# Patient Record
Sex: MALE | Race: WHITE | NOT HISPANIC OR LATINO
[De-identification: names, ages, dates, MRNs, and addresses within clinical notes are randomized per-mention and may not be internally consistent; named-entity substitution may affect disease eponyms.]

---

## 2017-01-26 ENCOUNTER — MEDICATION RENEWAL (OUTPATIENT)
Age: 72
End: 2017-01-26

## 2017-02-15 ENCOUNTER — APPOINTMENT (OUTPATIENT)
Dept: CARDIOLOGY | Facility: CLINIC | Age: 72
End: 2017-02-15

## 2017-02-15 ENCOUNTER — NON-APPOINTMENT (OUTPATIENT)
Age: 72
End: 2017-02-15

## 2017-02-15 VITALS
HEART RATE: 80 BPM | HEIGHT: 69 IN | RESPIRATION RATE: 12 BRPM | OXYGEN SATURATION: 98 % | DIASTOLIC BLOOD PRESSURE: 87 MMHG | WEIGHT: 194 LBS | BODY MASS INDEX: 28.73 KG/M2 | SYSTOLIC BLOOD PRESSURE: 155 MMHG

## 2017-03-01 ENCOUNTER — NON-APPOINTMENT (OUTPATIENT)
Age: 72
End: 2017-03-01

## 2017-03-01 ENCOUNTER — APPOINTMENT (OUTPATIENT)
Dept: CARDIOLOGY | Facility: CLINIC | Age: 72
End: 2017-03-01

## 2017-03-01 VITALS
OXYGEN SATURATION: 98 % | RESPIRATION RATE: 12 BRPM | SYSTOLIC BLOOD PRESSURE: 132 MMHG | WEIGHT: 196 LBS | HEIGHT: 69 IN | HEART RATE: 77 BPM | TEMPERATURE: 98.5 F | DIASTOLIC BLOOD PRESSURE: 81 MMHG | BODY MASS INDEX: 29.03 KG/M2

## 2017-03-02 LAB
ALBUMIN SERPL ELPH-MCNC: 4.3 G/DL
ALP BLD-CCNC: 103 U/L
ALT SERPL-CCNC: 23 U/L
ANION GAP SERPL CALC-SCNC: 18 MMOL/L
AST SERPL-CCNC: 20 U/L
BILIRUB SERPL-MCNC: 0.8 MG/DL
BUN SERPL-MCNC: 18 MG/DL
CALCIUM SERPL-MCNC: 9 MG/DL
CHLORIDE SERPL-SCNC: 103 MMOL/L
CHOLEST SERPL-MCNC: 123 MG/DL
CHOLEST/HDLC SERPL: 3.8 RATIO
CO2 SERPL-SCNC: 22 MMOL/L
CREAT SERPL-MCNC: 1.05 MG/DL
GLUCOSE SERPL-MCNC: 131 MG/DL
HDLC SERPL-MCNC: 32 MG/DL
LDLC SERPL CALC-MCNC: 49 MG/DL
POTASSIUM SERPL-SCNC: 4 MMOL/L
PROT SERPL-MCNC: 7.2 G/DL
SODIUM SERPL-SCNC: 143 MMOL/L
TRIGL SERPL-MCNC: 208 MG/DL

## 2017-03-03 LAB
BASOPHILS # BLD AUTO: 0.01 K/UL
BASOPHILS NFR BLD AUTO: 0.1 %
EOSINOPHIL # BLD AUTO: 0.19 K/UL
EOSINOPHIL NFR BLD AUTO: 2.7 %
HCT VFR BLD CALC: 43.2 %
HGB BLD-MCNC: 14.1 G/DL
IMM GRANULOCYTES NFR BLD AUTO: 0.1 %
LYMPHOCYTES # BLD AUTO: 2.14 K/UL
LYMPHOCYTES NFR BLD AUTO: 30.4 %
MAN DIFF?: NORMAL
MCHC RBC-ENTMCNC: 30.9 PG
MCHC RBC-ENTMCNC: 32.6 GM/DL
MCV RBC AUTO: 94.7 FL
MONOCYTES # BLD AUTO: 0.56 K/UL
MONOCYTES NFR BLD AUTO: 8 %
NEUTROPHILS # BLD AUTO: 4.13 K/UL
NEUTROPHILS NFR BLD AUTO: 58.7 %
PLATELET # BLD AUTO: 220 K/UL
RBC # BLD: 4.56 M/UL
RBC # FLD: 14 %
WBC # FLD AUTO: 7.04 K/UL

## 2017-03-05 ENCOUNTER — NON-APPOINTMENT (OUTPATIENT)
Age: 72
End: 2017-03-05

## 2017-04-24 ENCOUNTER — RX RENEWAL (OUTPATIENT)
Age: 72
End: 2017-04-24

## 2017-04-26 ENCOUNTER — MEDICATION RENEWAL (OUTPATIENT)
Age: 72
End: 2017-04-26

## 2017-05-23 ENCOUNTER — MEDICATION RENEWAL (OUTPATIENT)
Age: 72
End: 2017-05-23

## 2017-09-18 ENCOUNTER — RX RENEWAL (OUTPATIENT)
Age: 72
End: 2017-09-18

## 2017-09-20 ENCOUNTER — MEDICATION RENEWAL (OUTPATIENT)
Age: 72
End: 2017-09-20

## 2017-09-21 ENCOUNTER — RX RENEWAL (OUTPATIENT)
Age: 72
End: 2017-09-21

## 2017-10-31 ENCOUNTER — APPOINTMENT (OUTPATIENT)
Dept: CARDIOLOGY | Facility: CLINIC | Age: 72
End: 2017-10-31
Payer: MEDICARE

## 2017-10-31 ENCOUNTER — NON-APPOINTMENT (OUTPATIENT)
Age: 72
End: 2017-10-31

## 2017-10-31 VITALS — HEART RATE: 87 BPM | SYSTOLIC BLOOD PRESSURE: 134 MMHG | DIASTOLIC BLOOD PRESSURE: 86 MMHG

## 2017-10-31 VITALS
DIASTOLIC BLOOD PRESSURE: 84 MMHG | TEMPERATURE: 97.7 F | OXYGEN SATURATION: 95 % | HEIGHT: 69 IN | HEART RATE: 75 BPM | RESPIRATION RATE: 12 BRPM | WEIGHT: 198 LBS | SYSTOLIC BLOOD PRESSURE: 153 MMHG | BODY MASS INDEX: 29.33 KG/M2

## 2017-10-31 PROCEDURE — 99214 OFFICE O/P EST MOD 30 MIN: CPT | Mod: 25

## 2017-10-31 PROCEDURE — 93000 ELECTROCARDIOGRAM COMPLETE: CPT

## 2017-11-02 LAB
25(OH)D3 SERPL-MCNC: 24.9 NG/ML
ALBUMIN SERPL ELPH-MCNC: 4.6 G/DL
ALP BLD-CCNC: 101 U/L
ALT SERPL-CCNC: 23 U/L
ANION GAP SERPL CALC-SCNC: 14 MMOL/L
AST SERPL-CCNC: 24 U/L
BILIRUB SERPL-MCNC: 0.9 MG/DL
BUN SERPL-MCNC: 23 MG/DL
CALCIUM SERPL-MCNC: 9.6 MG/DL
CHLORIDE SERPL-SCNC: 99 MMOL/L
CHOLEST SERPL-MCNC: 130 MG/DL
CHOLEST/HDLC SERPL: 3.3 RATIO
CO2 SERPL-SCNC: 27 MMOL/L
CREAT SERPL-MCNC: 1.16 MG/DL
GLUCOSE SERPL-MCNC: 89 MG/DL
HBA1C MFR BLD HPLC: 5.4 %
HDLC SERPL-MCNC: 39 MG/DL
LDLC SERPL CALC-MCNC: 71 MG/DL
POTASSIUM SERPL-SCNC: 4.3 MMOL/L
PROT SERPL-MCNC: 8.4 G/DL
SODIUM SERPL-SCNC: 140 MMOL/L
TRIGL SERPL-MCNC: 101 MG/DL
TSH SERPL-ACNC: 1.9 UIU/ML

## 2017-11-09 LAB
BASOPHILS # BLD AUTO: 0.02 K/UL
BASOPHILS NFR BLD AUTO: 0.3 %
EOSINOPHIL # BLD AUTO: 0.13 K/UL
EOSINOPHIL NFR BLD AUTO: 1.9 %
HCT VFR BLD CALC: 46.6 %
HGB BLD-MCNC: 15.1 G/DL
IMM GRANULOCYTES NFR BLD AUTO: 0.3 %
LYMPHOCYTES # BLD AUTO: 2.15 K/UL
LYMPHOCYTES NFR BLD AUTO: 31.9 %
MAN DIFF?: NORMAL
MCHC RBC-ENTMCNC: 31.2 PG
MCHC RBC-ENTMCNC: 32.4 GM/DL
MCV RBC AUTO: 96.3 FL
MONOCYTES # BLD AUTO: 0.42 K/UL
MONOCYTES NFR BLD AUTO: 6.2 %
NEUTROPHILS # BLD AUTO: 4 K/UL
NEUTROPHILS NFR BLD AUTO: 59.4 %
PLATELET # BLD AUTO: 206 K/UL
RBC # BLD: 4.84 M/UL
RBC # FLD: 14.2 %
WBC # FLD AUTO: 6.74 K/UL

## 2017-12-18 ENCOUNTER — NON-APPOINTMENT (OUTPATIENT)
Age: 72
End: 2017-12-18

## 2017-12-18 ENCOUNTER — APPOINTMENT (OUTPATIENT)
Dept: CARDIOLOGY | Facility: CLINIC | Age: 72
End: 2017-12-18
Payer: MEDICARE

## 2017-12-18 VITALS
SYSTOLIC BLOOD PRESSURE: 145 MMHG | TEMPERATURE: 98.1 F | RESPIRATION RATE: 12 BRPM | BODY MASS INDEX: 27.7 KG/M2 | HEIGHT: 69 IN | DIASTOLIC BLOOD PRESSURE: 81 MMHG | OXYGEN SATURATION: 98 % | WEIGHT: 187 LBS | HEART RATE: 91 BPM

## 2017-12-18 VITALS — SYSTOLIC BLOOD PRESSURE: 126 MMHG | HEART RATE: 87 BPM | DIASTOLIC BLOOD PRESSURE: 76 MMHG

## 2017-12-18 PROCEDURE — 99214 OFFICE O/P EST MOD 30 MIN: CPT | Mod: 25

## 2017-12-18 PROCEDURE — 93000 ELECTROCARDIOGRAM COMPLETE: CPT

## 2017-12-18 RX ORDER — PROPRANOLOL HYDROCHLORIDE 120 MG/1
120 CAPSULE, EXTENDED RELEASE ORAL
Qty: 90 | Refills: 1 | Status: DISCONTINUED | COMMUNITY
Start: 2017-09-21 | End: 2017-12-18

## 2017-12-20 LAB
ALBUMIN SERPL ELPH-MCNC: 3.5 G/DL
ALP BLD-CCNC: 87 U/L
ALT SERPL-CCNC: 133 U/L
ANION GAP SERPL CALC-SCNC: 14 MMOL/L
AST SERPL-CCNC: 53 U/L
BASOPHILS # BLD AUTO: 0.01 K/UL
BASOPHILS NFR BLD AUTO: 0.1 %
BILIRUB SERPL-MCNC: 0.8 MG/DL
BUN SERPL-MCNC: 34 MG/DL
CALCIUM SERPL-MCNC: 9.4 MG/DL
CHLORIDE SERPL-SCNC: 101 MMOL/L
CHOLEST SERPL-MCNC: 169 MG/DL
CHOLEST/HDLC SERPL: 5.3 RATIO
CO2 SERPL-SCNC: 23 MMOL/L
CREAT SERPL-MCNC: 1.4 MG/DL
EOSINOPHIL # BLD AUTO: 0.03 K/UL
EOSINOPHIL NFR BLD AUTO: 0.3 %
GLUCOSE SERPL-MCNC: 119 MG/DL
HCT VFR BLD CALC: 45 %
HDLC SERPL-MCNC: 32 MG/DL
HGB BLD-MCNC: 14 G/DL
IMM GRANULOCYTES NFR BLD AUTO: 0.9 %
LDLC SERPL CALC-MCNC: 92 MG/DL
LYMPHOCYTES # BLD AUTO: 1.83 K/UL
LYMPHOCYTES NFR BLD AUTO: 15.6 %
MAN DIFF?: NORMAL
MCHC RBC-ENTMCNC: 30.2 PG
MCHC RBC-ENTMCNC: 31.1 GM/DL
MCV RBC AUTO: 97 FL
MONOCYTES # BLD AUTO: 0.68 K/UL
MONOCYTES NFR BLD AUTO: 5.8 %
NEUTROPHILS # BLD AUTO: 9.11 K/UL
NEUTROPHILS NFR BLD AUTO: 77.3 %
PLATELET # BLD AUTO: 369 K/UL
POTASSIUM SERPL-SCNC: 4.4 MMOL/L
PROT SERPL-MCNC: 7.7 G/DL
RBC # BLD: 4.64 M/UL
RBC # FLD: 14.1 %
SODIUM SERPL-SCNC: 138 MMOL/L
TRIGL SERPL-MCNC: 226 MG/DL
TSH SERPL-ACNC: 1.7 UIU/ML
WBC # FLD AUTO: 11.76 K/UL

## 2018-01-07 ENCOUNTER — TRANSCRIPTION ENCOUNTER (OUTPATIENT)
Age: 73
End: 2018-01-07

## 2018-01-11 ENCOUNTER — NON-APPOINTMENT (OUTPATIENT)
Age: 73
End: 2018-01-11

## 2018-01-11 ENCOUNTER — APPOINTMENT (OUTPATIENT)
Dept: CARDIOLOGY | Facility: CLINIC | Age: 73
End: 2018-01-11
Payer: MEDICARE

## 2018-01-11 VITALS
HEART RATE: 78 BPM | HEIGHT: 69 IN | OXYGEN SATURATION: 96 % | BODY MASS INDEX: 27.7 KG/M2 | SYSTOLIC BLOOD PRESSURE: 128 MMHG | DIASTOLIC BLOOD PRESSURE: 76 MMHG | WEIGHT: 187 LBS

## 2018-01-11 PROCEDURE — 99214 OFFICE O/P EST MOD 30 MIN: CPT | Mod: 25

## 2018-01-11 PROCEDURE — 93000 ELECTROCARDIOGRAM COMPLETE: CPT

## 2018-01-15 ENCOUNTER — NON-APPOINTMENT (OUTPATIENT)
Age: 73
End: 2018-01-15

## 2018-01-19 ENCOUNTER — MEDICATION RENEWAL (OUTPATIENT)
Age: 73
End: 2018-01-19

## 2018-02-07 ENCOUNTER — APPOINTMENT (OUTPATIENT)
Dept: CARDIOLOGY | Facility: CLINIC | Age: 73
End: 2018-02-07
Payer: MEDICARE

## 2018-02-07 ENCOUNTER — NON-APPOINTMENT (OUTPATIENT)
Age: 73
End: 2018-02-07

## 2018-02-07 VITALS
SYSTOLIC BLOOD PRESSURE: 133 MMHG | HEART RATE: 75 BPM | TEMPERATURE: 98.1 F | DIASTOLIC BLOOD PRESSURE: 72 MMHG | OXYGEN SATURATION: 98 % | WEIGHT: 190 LBS | RESPIRATION RATE: 12 BRPM | HEIGHT: 69 IN | BODY MASS INDEX: 28.14 KG/M2

## 2018-02-07 PROCEDURE — 99214 OFFICE O/P EST MOD 30 MIN: CPT | Mod: 25

## 2018-02-07 PROCEDURE — 93000 ELECTROCARDIOGRAM COMPLETE: CPT

## 2018-02-10 LAB
ALBUMIN SERPL ELPH-MCNC: 4 G/DL
ALP BLD-CCNC: 95 U/L
ALT SERPL-CCNC: 10 U/L
ANION GAP SERPL CALC-SCNC: 15 MMOL/L
ANION GAP SERPL CALC-SCNC: 15 MMOL/L
AST SERPL-CCNC: 26 U/L
BASOPHILS # BLD AUTO: 0.01 K/UL
BASOPHILS NFR BLD AUTO: 0.1 %
BILIRUB SERPL-MCNC: 1 MG/DL
BUN SERPL-MCNC: 21 MG/DL
BUN SERPL-MCNC: 23 MG/DL
CALCIUM SERPL-MCNC: 9.6 MG/DL
CALCIUM SERPL-MCNC: 9.8 MG/DL
CHLORIDE SERPL-SCNC: 99 MMOL/L
CHLORIDE SERPL-SCNC: 99 MMOL/L
CO2 SERPL-SCNC: 27 MMOL/L
CO2 SERPL-SCNC: 28 MMOL/L
CREAT SERPL-MCNC: 1.41 MG/DL
CREAT SERPL-MCNC: 1.46 MG/DL
EOSINOPHIL # BLD AUTO: 0.2 K/UL
EOSINOPHIL NFR BLD AUTO: 2.7 %
GLUCOSE SERPL-MCNC: 101 MG/DL
GLUCOSE SERPL-MCNC: 105 MG/DL
HCT VFR BLD CALC: 40.1 %
HGB BLD-MCNC: 13.1 G/DL
IMM GRANULOCYTES NFR BLD AUTO: 1 %
LYMPHOCYTES # BLD AUTO: 2.39 K/UL
LYMPHOCYTES NFR BLD AUTO: 32.8 %
MAN DIFF?: NORMAL
MCHC RBC-ENTMCNC: 30.2 PG
MCHC RBC-ENTMCNC: 32.7 GM/DL
MCV RBC AUTO: 92.4 FL
MONOCYTES # BLD AUTO: 0.75 K/UL
MONOCYTES NFR BLD AUTO: 10.3 %
NEUTROPHILS # BLD AUTO: 3.87 K/UL
NEUTROPHILS NFR BLD AUTO: 53.1 %
PLATELET # BLD AUTO: 250 K/UL
POTASSIUM SERPL-SCNC: 4 MMOL/L
POTASSIUM SERPL-SCNC: 4.1 MMOL/L
PROT SERPL-MCNC: 7.6 G/DL
RBC # BLD: 4.34 M/UL
RBC # FLD: 14.1 %
SODIUM SERPL-SCNC: 141 MMOL/L
SODIUM SERPL-SCNC: 142 MMOL/L
WBC # FLD AUTO: 7.29 K/UL

## 2018-02-24 ENCOUNTER — RX RENEWAL (OUTPATIENT)
Age: 73
End: 2018-02-24

## 2018-05-04 ENCOUNTER — RX RENEWAL (OUTPATIENT)
Age: 73
End: 2018-05-04

## 2018-05-10 ENCOUNTER — APPOINTMENT (OUTPATIENT)
Dept: INTERNAL MEDICINE | Facility: CLINIC | Age: 73
End: 2018-05-10
Payer: MEDICARE

## 2018-05-10 VITALS — DIASTOLIC BLOOD PRESSURE: 68 MMHG | SYSTOLIC BLOOD PRESSURE: 130 MMHG

## 2018-05-10 VITALS
DIASTOLIC BLOOD PRESSURE: 78 MMHG | OXYGEN SATURATION: 97 % | HEIGHT: 69 IN | TEMPERATURE: 99.1 F | HEART RATE: 75 BPM | RESPIRATION RATE: 12 BRPM | SYSTOLIC BLOOD PRESSURE: 145 MMHG | BODY MASS INDEX: 29.33 KG/M2 | WEIGHT: 198 LBS

## 2018-05-10 DIAGNOSIS — Z12.5 ENCOUNTER FOR SCREENING FOR MALIGNANT NEOPLASM OF PROSTATE: ICD-10-CM

## 2018-05-10 DIAGNOSIS — Z12.11 ENCOUNTER FOR SCREENING FOR MALIGNANT NEOPLASM OF COLON: ICD-10-CM

## 2018-05-10 DIAGNOSIS — Z78.9 OTHER SPECIFIED HEALTH STATUS: ICD-10-CM

## 2018-05-10 PROCEDURE — G0438: CPT

## 2018-05-16 ENCOUNTER — APPOINTMENT (OUTPATIENT)
Dept: CARDIOLOGY | Facility: CLINIC | Age: 73
End: 2018-05-16
Payer: MEDICARE

## 2018-05-16 ENCOUNTER — NON-APPOINTMENT (OUTPATIENT)
Age: 73
End: 2018-05-16

## 2018-05-16 ENCOUNTER — RX RENEWAL (OUTPATIENT)
Age: 73
End: 2018-05-16

## 2018-05-16 VITALS
WEIGHT: 196 LBS | DIASTOLIC BLOOD PRESSURE: 83 MMHG | RESPIRATION RATE: 12 BRPM | BODY MASS INDEX: 29.03 KG/M2 | HEART RATE: 80 BPM | OXYGEN SATURATION: 96 % | SYSTOLIC BLOOD PRESSURE: 139 MMHG | HEIGHT: 69 IN

## 2018-05-16 DIAGNOSIS — I27.0 PRIMARY PULMONARY HYPERTENSION: ICD-10-CM

## 2018-05-16 PROCEDURE — 99213 OFFICE O/P EST LOW 20 MIN: CPT

## 2018-05-16 PROCEDURE — 93306 TTE W/DOPPLER COMPLETE: CPT

## 2018-05-16 PROCEDURE — 99214 OFFICE O/P EST MOD 30 MIN: CPT | Mod: 25

## 2018-05-16 PROCEDURE — 93000 ELECTROCARDIOGRAM COMPLETE: CPT

## 2018-05-19 ENCOUNTER — NON-APPOINTMENT (OUTPATIENT)
Age: 73
End: 2018-05-19

## 2018-05-23 LAB
25(OH)D3 SERPL-MCNC: 15.8 NG/ML
ALBUMIN SERPL ELPH-MCNC: 4.5 G/DL
ALP BLD-CCNC: 79 U/L
ALT SERPL-CCNC: 6 U/L
ANION GAP SERPL CALC-SCNC: 15 MMOL/L
AST SERPL-CCNC: 19 U/L
BACTERIA UR CULT: NORMAL
BASOPHILS # BLD AUTO: 0.01 K/UL
BASOPHILS NFR BLD AUTO: 0.1 %
BILIRUB SERPL-MCNC: 0.8 MG/DL
BUN SERPL-MCNC: 19 MG/DL
CALCIUM SERPL-MCNC: 9.5 MG/DL
CHLORIDE SERPL-SCNC: 99 MMOL/L
CHOLEST SERPL-MCNC: 222 MG/DL
CHOLEST/HDLC SERPL: 6.3 RATIO
CO2 SERPL-SCNC: 28 MMOL/L
CREAT SERPL-MCNC: 1.22 MG/DL
CREAT SPEC-SCNC: 48 MG/DL
EOSINOPHIL # BLD AUTO: 0.13 K/UL
EOSINOPHIL NFR BLD AUTO: 1.9 %
FOLATE SERPL-MCNC: 11.7 NG/ML
GLUCOSE SERPL-MCNC: 74 MG/DL
HBA1C MFR BLD HPLC: 5.4 %
HCT VFR BLD CALC: 48 %
HDLC SERPL-MCNC: 35 MG/DL
HGB BLD-MCNC: 15.1 G/DL
IMM GRANULOCYTES NFR BLD AUTO: 0.3 %
IRON SATN MFR SERPL: 26 %
IRON SERPL-MCNC: 95 UG/DL
LDLC SERPL CALC-MCNC: 150 MG/DL
LYMPHOCYTES # BLD AUTO: 2.44 K/UL
LYMPHOCYTES NFR BLD AUTO: 36 %
MAN DIFF?: NORMAL
MCHC RBC-ENTMCNC: 30 PG
MCHC RBC-ENTMCNC: 31.5 GM/DL
MCV RBC AUTO: 95.4 FL
MICROALBUMIN 24H UR DL<=1MG/L-MCNC: 0.7 MG/DL
MICROALBUMIN/CREAT 24H UR-RTO: 15 MG/G
MONOCYTES # BLD AUTO: 0.54 K/UL
MONOCYTES NFR BLD AUTO: 8 %
NEUTROPHILS # BLD AUTO: 3.64 K/UL
NEUTROPHILS NFR BLD AUTO: 53.7 %
PLATELET # BLD AUTO: 224 K/UL
POTASSIUM SERPL-SCNC: 4 MMOL/L
PROT SERPL-MCNC: 8.1 G/DL
PSA SERPL-MCNC: 0.92 NG/ML
RBC # BLD: 5.03 M/UL
RBC # FLD: 14.7 %
SODIUM SERPL-SCNC: 142 MMOL/L
TIBC SERPL-MCNC: 368 UG/DL
TRIGL SERPL-MCNC: 184 MG/DL
TSH SERPL-ACNC: 2.16 UIU/ML
UIBC SERPL-MCNC: 273 UG/DL
VIT B12 SERPL-MCNC: 370 PG/ML
WBC # FLD AUTO: 6.78 K/UL

## 2018-06-21 DIAGNOSIS — R79.89 OTHER SPECIFIED ABNORMAL FINDINGS OF BLOOD CHEMISTRY: ICD-10-CM

## 2018-06-21 LAB
APPEARANCE: CLEAR
BILIRUBIN URINE: NEGATIVE
BLOOD URINE: NEGATIVE
COLOR: YELLOW
GLUCOSE QUALITATIVE U: NEGATIVE MG/DL
KETONES URINE: NEGATIVE
LEUKOCYTE ESTERASE URINE: NEGATIVE
NITRITE URINE: NEGATIVE
PH URINE: 5.5
PROTEIN URINE: NEGATIVE MG/DL
SPECIFIC GRAVITY URINE: 1.01
UROBILINOGEN URINE: NEGATIVE MG/DL

## 2018-06-22 RX ORDER — CHOLECALCIFEROL (VITAMIN D3) 1250 MCG
1.25 MG CAPSULE ORAL
Qty: 14 | Refills: 0 | Status: ACTIVE | COMMUNITY
Start: 2018-06-21 | End: 1900-01-01

## 2018-09-11 ENCOUNTER — RX RENEWAL (OUTPATIENT)
Age: 73
End: 2018-09-11

## 2018-10-28 ENCOUNTER — RX RENEWAL (OUTPATIENT)
Age: 73
End: 2018-10-28

## 2018-11-19 ENCOUNTER — RX RENEWAL (OUTPATIENT)
Age: 73
End: 2018-11-19

## 2018-11-21 ENCOUNTER — APPOINTMENT (OUTPATIENT)
Dept: CARDIOLOGY | Facility: CLINIC | Age: 73
End: 2018-11-21
Payer: MEDICARE

## 2018-11-21 ENCOUNTER — NON-APPOINTMENT (OUTPATIENT)
Age: 73
End: 2018-11-21

## 2018-11-21 VITALS
HEART RATE: 75 BPM | HEIGHT: 69 IN | OXYGEN SATURATION: 99 % | BODY MASS INDEX: 29.62 KG/M2 | WEIGHT: 200 LBS | SYSTOLIC BLOOD PRESSURE: 154 MMHG | DIASTOLIC BLOOD PRESSURE: 72 MMHG | RESPIRATION RATE: 12 BRPM

## 2018-11-21 PROCEDURE — 99214 OFFICE O/P EST MOD 30 MIN: CPT | Mod: 25

## 2018-11-21 PROCEDURE — 93000 ELECTROCARDIOGRAM COMPLETE: CPT

## 2018-11-21 PROCEDURE — 99212 OFFICE O/P EST SF 10 MIN: CPT

## 2018-11-21 RX ORDER — PREDNISONE 20 MG/1
20 TABLET ORAL
Qty: 4 | Refills: 0 | Status: DISCONTINUED | COMMUNITY
End: 2018-11-21

## 2018-11-21 NOTE — HISTORY OF PRESENT ILLNESS
[FreeTextEntry1] : Mr Wakefield arrives today for follow up of arrhythmia management.  He is feeling very well. He is maintaining sinus rhythm on Multaq. Excellent exercise tolerance, however still mild left left strength, stroke residual. Tolerating anticoagulation with no issues.

## 2018-11-21 NOTE — PHYSICAL EXAM
[General Appearance - Well Developed] : well developed [General Appearance - In No Acute Distress] : no acute distress [Normal Oral Mucosa] : normal oral mucosa [Heart Rate And Rhythm] : heart rate and rhythm were normal [Heart Sounds] : normal S1 and S2 [Respiration, Rhythm And Depth] : normal respiratory rhythm and effort [Bowel Sounds] : normal bowel sounds [Abnormal Walk] : normal gait [Skin Color & Pigmentation] : normal skin color and pigmentation [Oriented To Time, Place, And Person] : oriented to person, place, and time

## 2018-11-21 NOTE — DISCUSSION/SUMMARY
[FreeTextEntry1] : Mr Wakefield is a 69 y/o male with recent CABG x 2, ASD closure, MAZE, Post operative stroke with left hemiparesis, now almost resolved (left leg still feels tight). He presents in sinus rhythm.  He has been off  on Multaq and anticoagulation. No changes from EP stand point. See back in a year.

## 2018-11-22 ENCOUNTER — NON-APPOINTMENT (OUTPATIENT)
Age: 73
End: 2018-11-22

## 2018-11-23 LAB
25(OH)D3 SERPL-MCNC: 48.1 NG/ML
ALBUMIN SERPL ELPH-MCNC: 4.2 G/DL
ALP BLD-CCNC: 84 U/L
ALT SERPL-CCNC: 16 U/L
ANION GAP SERPL CALC-SCNC: 13 MMOL/L
AST SERPL-CCNC: 17 U/L
BASOPHILS # BLD AUTO: 0.01 K/UL
BASOPHILS NFR BLD AUTO: 0.1 %
BILIRUB SERPL-MCNC: 0.8 MG/DL
BUN SERPL-MCNC: 26 MG/DL
CALCIUM SERPL-MCNC: 9.4 MG/DL
CHLORIDE SERPL-SCNC: 103 MMOL/L
CHOLEST SERPL-MCNC: 124 MG/DL
CHOLEST/HDLC SERPL: 3.8 RATIO
CO2 SERPL-SCNC: 25 MMOL/L
CREAT SERPL-MCNC: 1.26 MG/DL
EOSINOPHIL # BLD AUTO: 0.19 K/UL
EOSINOPHIL NFR BLD AUTO: 2.7 %
GLUCOSE SERPL-MCNC: 87 MG/DL
HBA1C MFR BLD HPLC: 5.4 %
HCT VFR BLD CALC: 43.8 %
HDLC SERPL-MCNC: 33 MG/DL
HGB BLD-MCNC: 14.5 G/DL
IMM GRANULOCYTES NFR BLD AUTO: 0.1 %
LDLC SERPL CALC-MCNC: 66 MG/DL
LYMPHOCYTES # BLD AUTO: 2.42 K/UL
LYMPHOCYTES NFR BLD AUTO: 34.2 %
MAN DIFF?: NORMAL
MCHC RBC-ENTMCNC: 31.4 PG
MCHC RBC-ENTMCNC: 33.1 GM/DL
MCV RBC AUTO: 94.8 FL
MONOCYTES # BLD AUTO: 0.52 K/UL
MONOCYTES NFR BLD AUTO: 7.4 %
NEUTROPHILS # BLD AUTO: 3.92 K/UL
NEUTROPHILS NFR BLD AUTO: 55.5 %
PLATELET # BLD AUTO: 207 K/UL
POTASSIUM SERPL-SCNC: 4 MMOL/L
PROT SERPL-MCNC: 7.7 G/DL
RBC # BLD: 4.62 M/UL
RBC # FLD: 14 %
SODIUM SERPL-SCNC: 141 MMOL/L
TRIGL SERPL-MCNC: 125 MG/DL
TSH SERPL-ACNC: 2.48 UIU/ML
WBC # FLD AUTO: 7.07 K/UL

## 2018-11-23 NOTE — HISTORY OF PRESENT ILLNESS
[FreeTextEntry1] : Patient is a 73 year old man with a history of a secundum ASD status post closure with a bovine pericardial patch 3/17/16, coronary artery disease status post LIMA to LAD and SVG to diagonal artery, resection of MARIEL, and left CryoMaze ablation 3/17/16 whose surgery was complicated by a cerebrovascular accident with residual hemiparesis, paroxysmal atrial fibrillation, atrial flutter status cardioversion 5/3/2016, HTN, and dyslipidemia who presents today for follow up of atrial fibrillation and HTN. He states that he has been feeling well with his only complaint being that his left leg is still weak. He otherwise denies any chest pain, dyspnea, palpitations, headaches, and dizziness.

## 2018-11-23 NOTE — DISCUSSION/SUMMARY
[FreeTextEntry1] : IMPRESSION: Mr. Wakefield is a 73 year old man with a history of a secundum ASD status post closure with a bovine pericardial patch 3/17/2016, coronary artery disease status post 2-vessel CABG with LIMA to the LAD and SVG to the diagonal, resection of the left atrial appendage, and left CryoMaze ablation all on 3/17/2016 whose procedures were complicated by a cerebrovascular accident with residual weakness, paroxysmal atrial fibrillation, atrial flutter status post cardioversion 5/3/2016, HTN, and dyslipidemia who presents today for follow up of atrial fibrillation and HTN.\par \par PLAN:\par 1. He is currently in sinus rhythm, thus he will continue on Propranolol 10mg three times a day and Multaq 400mg twice daily. He will also continue on Xarelto 20mg daily for systemic anticoagulation. I will be checking a CBC and CMP today. He asked to reduce the medications that he takes. \par 2. He will continue on ASA 81mg daily given his CAD. He will continue on Lipitor 10mg daily and I will be checking a CMP and lipid profile today. His LDL goal is at least less than 100, although ideally close to 70. 3. His blood pressure is acceptable, thus he will continue on Lasix (he is unsure as to the dose that he is taking). He was previously on Lasix 20mg alternating with 40mg on an every other day basis as well as Propranolol. I will discuss with his wife his medical regimen when we discuss his blood test results.  \par 4. He will follow up with me in 4 months or sooner should he experience any symptoms in the interim.\par 5. He understands that he requires antibiotic prophylaxis for dental procedures.

## 2018-11-23 NOTE — PHYSICAL EXAM
[General Appearance - Well Developed] : well developed [Normal Appearance] : normal appearance [Well Groomed] : well groomed [General Appearance - Well Nourished] : well nourished [No Deformities] : no deformities [General Appearance - In No Acute Distress] : no acute distress [Normal Conjunctiva] : the conjunctiva exhibited no abnormalities [Normal Oral Mucosa] : normal oral mucosa [No Oral Pallor] : no oral pallor [No Oral Cyanosis] : no oral cyanosis [Normal Jugular Venous A Waves Present] : normal jugular venous A waves present [Normal Jugular Venous V Waves Present] : normal jugular venous V waves present [No Jugular Venous Jarvis A Waves] : no jugular venous jarvis A waves [Respiration, Rhythm And Depth] : normal respiratory rhythm and effort [Exaggerated Use Of Accessory Muscles For Inspiration] : no accessory muscle use [Auscultation Breath Sounds / Voice Sounds] : lungs were clear to auscultation bilaterally [Bowel Sounds] : normal bowel sounds [Abdomen Soft] : soft [Abdomen Tenderness] : non-tender [Abnormal Walk] : normal gait [Nail Clubbing] : no clubbing of the fingernails [Cyanosis, Localized] : no localized cyanosis [Petechial Hemorrhages (___cm)] : no petechial hemorrhages [Skin Color & Pigmentation] : normal skin color and pigmentation [] : no rash [Skin Lesions] : no skin lesions [No Skin Ulcers] : no skin ulcer [Oriented To Time, Place, And Person] : oriented to person, place, and time [Affect] : the affect was normal [Mood] : the mood was normal [No Anxiety] : not feeling anxious [Normal Rate] : normal [Rhythm Regular] : regular [Normal S1] : normal S1 [Normal S2] : normal S2 [I] : a grade 1 [No Pitting Edema] : no pitting edema present [FreeTextEntry1] : He has weakness of his left lower extremity. [S3] : no S3 [Right Carotid Bruit] : no bruit heard over the right carotid [Left Carotid Bruit] : no bruit heard over the left carotid [Bruit] : no bruit heard

## 2018-12-08 ENCOUNTER — RX RENEWAL (OUTPATIENT)
Age: 73
End: 2018-12-08

## 2018-12-23 ENCOUNTER — RX RENEWAL (OUTPATIENT)
Age: 73
End: 2018-12-23

## 2019-01-08 ENCOUNTER — RX RENEWAL (OUTPATIENT)
Age: 74
End: 2019-01-08

## 2019-01-28 ENCOUNTER — RX RENEWAL (OUTPATIENT)
Age: 74
End: 2019-01-28

## 2019-01-29 ENCOUNTER — RX RENEWAL (OUTPATIENT)
Age: 74
End: 2019-01-29

## 2019-02-10 ENCOUNTER — RX RENEWAL (OUTPATIENT)
Age: 74
End: 2019-02-10

## 2019-02-11 ENCOUNTER — RX RENEWAL (OUTPATIENT)
Age: 74
End: 2019-02-11

## 2019-03-24 ENCOUNTER — RX RENEWAL (OUTPATIENT)
Age: 74
End: 2019-03-24

## 2019-03-25 ENCOUNTER — RX RENEWAL (OUTPATIENT)
Age: 74
End: 2019-03-25

## 2019-04-17 ENCOUNTER — NON-APPOINTMENT (OUTPATIENT)
Age: 74
End: 2019-04-17

## 2019-04-17 ENCOUNTER — APPOINTMENT (OUTPATIENT)
Dept: CARDIOLOGY | Facility: CLINIC | Age: 74
End: 2019-04-17
Payer: MEDICARE

## 2019-04-17 VITALS
OXYGEN SATURATION: 99 % | RESPIRATION RATE: 12 BRPM | TEMPERATURE: 97.6 F | HEIGHT: 69 IN | BODY MASS INDEX: 29.33 KG/M2 | WEIGHT: 198 LBS | DIASTOLIC BLOOD PRESSURE: 79 MMHG | HEART RATE: 91 BPM | SYSTOLIC BLOOD PRESSURE: 162 MMHG

## 2019-04-17 VITALS — DIASTOLIC BLOOD PRESSURE: 84 MMHG | SYSTOLIC BLOOD PRESSURE: 120 MMHG | HEART RATE: 87 BPM

## 2019-04-17 PROCEDURE — 99214 OFFICE O/P EST MOD 30 MIN: CPT | Mod: 25

## 2019-04-17 PROCEDURE — 93000 ELECTROCARDIOGRAM COMPLETE: CPT

## 2019-04-17 NOTE — DISCUSSION/SUMMARY
[FreeTextEntry1] : IMPRESSION: Mr. Wakefield is a 73 year old man with a history of a secundum ASD status post closure with a bovine pericardial patch 3/17/2016, coronary artery disease status post 2-vessel CABG with LIMA to the LAD and SVG to the diagonal, resection of the left atrial appendage, and left CryoMaze ablation all on 3/17/2016 whose procedures were complicated by a cerebrovascular accident with residual weakness, paroxysmal atrial fibrillation, atrial flutter status post cardioversion 5/3/2016, HTN, and dyslipidemia who presents today for follow up of atrial fibrillation and HTN.\par \par PLAN:\par 1. He is currently in sinus rhythm, thus he will continue on Propranolol 10mg three times a day and Multaq 400mg twice daily. He will also continue on Xarelto 20mg daily for systemic anticoagulation. I will be checking a CBC and CMP today. He asked to reduce the medications that he takes, however, I have explained that I do not feel that this is feasible at this time. \par 2. He will continue on ASA 81mg daily given his CAD. He will continue on Lipitor 10mg daily and I will be checking a CMP and lipid profile today. His LDL goal is at least less than 100, although ideally close to 70. 3. His blood pressure is acceptable, thus he will continue on Lasix 20 mg daily and Propranolol.  \par 4. He will follow up with me in 4-6 months or sooner should he experience any symptoms in the interim.\par 5. He understands that he requires antibiotic prophylaxis for dental procedures. \par 6. I have asked him to schedule an echocardiogram at the time of his next visit given his previous pulmonary hypertension.\par 7. I also asked him to followup with his neurologist given his CVA.

## 2019-04-17 NOTE — PHYSICAL EXAM
[General Appearance - Well Developed] : well developed [Normal Appearance] : normal appearance [Well Groomed] : well groomed [General Appearance - Well Nourished] : well nourished [No Deformities] : no deformities [General Appearance - In No Acute Distress] : no acute distress [Normal Conjunctiva] : the conjunctiva exhibited no abnormalities [Normal Oral Mucosa] : normal oral mucosa [No Oral Pallor] : no oral pallor [Normal Jugular Venous A Waves Present] : normal jugular venous A waves present [No Oral Cyanosis] : no oral cyanosis [No Jugular Venous Jarvis A Waves] : no jugular venous jarvis A waves [Normal Jugular Venous V Waves Present] : normal jugular venous V waves present [Respiration, Rhythm And Depth] : normal respiratory rhythm and effort [Exaggerated Use Of Accessory Muscles For Inspiration] : no accessory muscle use [Auscultation Breath Sounds / Voice Sounds] : lungs were clear to auscultation bilaterally [Abdomen Soft] : soft [Abdomen Tenderness] : non-tender [Bowel Sounds] : normal bowel sounds [Abnormal Walk] : normal gait [Nail Clubbing] : no clubbing of the fingernails [Cyanosis, Localized] : no localized cyanosis [Petechial Hemorrhages (___cm)] : no petechial hemorrhages [] : no rash [Skin Color & Pigmentation] : normal skin color and pigmentation [Skin Lesions] : no skin lesions [No Skin Ulcers] : no skin ulcer [Oriented To Time, Place, And Person] : oriented to person, place, and time [Affect] : the affect was normal [Mood] : the mood was normal [No Anxiety] : not feeling anxious [Normal Rate] : normal [Rhythm Regular] : regular [Normal S1] : normal S1 [Normal S2] : normal S2 [I] : a grade 1 [No Pitting Edema] : no pitting edema present [FreeTextEntry1] : He has weakness of his left lower extremity. [Left Carotid Bruit] : no bruit heard over the left carotid [Right Carotid Bruit] : no bruit heard over the right carotid [S3] : no S3 [Bruit] : no bruit heard

## 2019-04-17 NOTE — CARDIOLOGY SUMMARY
[LVEF ___%] : LVEF [unfilled]% [___] : [unfilled] [___] : [unfilled] [Mild] : mild mitral regurgitation [None] : no pulmonary hypertension

## 2019-04-17 NOTE — HISTORY OF PRESENT ILLNESS
[FreeTextEntry1] : Patient is a 73 year old man with a history of a secundum ASD status post closure with a bovine pericardial patch 3/17/16, coronary artery disease status post LIMA to LAD and SVG to diagonal artery, resection of MARIEL, and left CryoMaze ablation 3/17/16 whose surgery was complicated by a cerebrovascular accident with residual hemiparesis, paroxysmal atrial fibrillation, atrial flutter status cardioversion 5/3/2016, HTN, and dyslipidemia who presents today for follow up of atrial fibrillation and HTN. He states that he has been feeling well with his only complaint being that his left leg is still weak. He otherwise denies any chest pain, dyspnea, palpitations, headaches, and dizziness. He wishes to stop taking Furosemide as he states that it gives him increased urination.

## 2019-04-22 LAB
ALBUMIN SERPL ELPH-MCNC: 4.4 G/DL
ALP BLD-CCNC: 77 U/L
ALT SERPL-CCNC: 14 U/L
ANION GAP SERPL CALC-SCNC: 14 MMOL/L
AST SERPL-CCNC: 15 U/L
BASOPHILS # BLD AUTO: 0.03 K/UL
BASOPHILS NFR BLD AUTO: 0.5 %
BILIRUB SERPL-MCNC: 0.7 MG/DL
BUN SERPL-MCNC: 20 MG/DL
CALCIUM SERPL-MCNC: 9.3 MG/DL
CHLORIDE SERPL-SCNC: 102 MMOL/L
CHOLEST SERPL-MCNC: 139 MG/DL
CHOLEST/HDLC SERPL: 4.1 RATIO
CO2 SERPL-SCNC: 26 MMOL/L
CREAT SERPL-MCNC: 1.05 MG/DL
EOSINOPHIL # BLD AUTO: 0.13 K/UL
EOSINOPHIL NFR BLD AUTO: 2 %
GLUCOSE SERPL-MCNC: 107 MG/DL
HCT VFR BLD CALC: 45.5 %
HDLC SERPL-MCNC: 34 MG/DL
HGB BLD-MCNC: 14.4 G/DL
IMM GRANULOCYTES NFR BLD AUTO: 0.3 %
LDLC SERPL CALC-MCNC: 77 MG/DL
LYMPHOCYTES # BLD AUTO: 2.42 K/UL
LYMPHOCYTES NFR BLD AUTO: 36.5 %
MAN DIFF?: NORMAL
MCHC RBC-ENTMCNC: 31.2 PG
MCHC RBC-ENTMCNC: 31.6 GM/DL
MCV RBC AUTO: 98.7 FL
MONOCYTES # BLD AUTO: 0.54 K/UL
MONOCYTES NFR BLD AUTO: 8.1 %
NEUTROPHILS # BLD AUTO: 3.49 K/UL
NEUTROPHILS NFR BLD AUTO: 52.6 %
PLATELET # BLD AUTO: 236 K/UL
POTASSIUM SERPL-SCNC: 4 MMOL/L
PROT SERPL-MCNC: 7.4 G/DL
PSA SERPL-MCNC: 1.03 NG/ML
RBC # BLD: 4.61 M/UL
RBC # FLD: 13.8 %
SODIUM SERPL-SCNC: 142 MMOL/L
TRIGL SERPL-MCNC: 142 MG/DL
TSH SERPL-ACNC: 2.1 UIU/ML
WBC # FLD AUTO: 6.63 K/UL

## 2019-04-29 ENCOUNTER — RX RENEWAL (OUTPATIENT)
Age: 74
End: 2019-04-29

## 2019-05-14 ENCOUNTER — RX RENEWAL (OUTPATIENT)
Age: 74
End: 2019-05-14

## 2019-09-15 ENCOUNTER — RX RENEWAL (OUTPATIENT)
Age: 74
End: 2019-09-15

## 2019-10-01 ENCOUNTER — RX RENEWAL (OUTPATIENT)
Age: 74
End: 2019-10-01

## 2019-10-21 ENCOUNTER — NON-APPOINTMENT (OUTPATIENT)
Age: 74
End: 2019-10-21

## 2019-10-21 ENCOUNTER — APPOINTMENT (OUTPATIENT)
Dept: CARDIOLOGY | Facility: CLINIC | Age: 74
End: 2019-10-21
Payer: MEDICARE

## 2019-10-21 VITALS
DIASTOLIC BLOOD PRESSURE: 75 MMHG | HEIGHT: 69 IN | HEART RATE: 83 BPM | RESPIRATION RATE: 12 BRPM | BODY MASS INDEX: 29.77 KG/M2 | WEIGHT: 201 LBS | OXYGEN SATURATION: 98 % | SYSTOLIC BLOOD PRESSURE: 148 MMHG

## 2019-10-21 VITALS — DIASTOLIC BLOOD PRESSURE: 72 MMHG | SYSTOLIC BLOOD PRESSURE: 126 MMHG

## 2019-10-21 LAB
ALBUMIN SERPL ELPH-MCNC: 4.5 G/DL
ALP BLD-CCNC: 85 U/L
ALT SERPL-CCNC: 23 U/L
ANION GAP SERPL CALC-SCNC: 11 MMOL/L
AST SERPL-CCNC: 18 U/L
BASOPHILS # BLD AUTO: 0.02 K/UL
BASOPHILS NFR BLD AUTO: 0.3 %
BILIRUB SERPL-MCNC: 0.7 MG/DL
BUN SERPL-MCNC: 20 MG/DL
CALCIUM SERPL-MCNC: 9.3 MG/DL
CHLORIDE SERPL-SCNC: 103 MMOL/L
CHOLEST SERPL-MCNC: 113 MG/DL
CHOLEST/HDLC SERPL: 3.1 RATIO
CO2 SERPL-SCNC: 27 MMOL/L
CREAT SERPL-MCNC: 1.2 MG/DL
EOSINOPHIL # BLD AUTO: 0.12 K/UL
EOSINOPHIL NFR BLD AUTO: 1.8 %
ESTIMATED AVERAGE GLUCOSE: 108 MG/DL
GLUCOSE SERPL-MCNC: 120 MG/DL
HBA1C MFR BLD HPLC: 5.4 %
HCT VFR BLD CALC: 45.9 %
HDLC SERPL-MCNC: 37 MG/DL
HGB BLD-MCNC: 14.8 G/DL
IMM GRANULOCYTES NFR BLD AUTO: 0.3 %
LDLC SERPL CALC-MCNC: 49 MG/DL
LYMPHOCYTES # BLD AUTO: 2.04 K/UL
LYMPHOCYTES NFR BLD AUTO: 31.1 %
MAN DIFF?: NORMAL
MCHC RBC-ENTMCNC: 31.4 PG
MCHC RBC-ENTMCNC: 32.2 GM/DL
MCV RBC AUTO: 97.5 FL
MONOCYTES # BLD AUTO: 0.46 K/UL
MONOCYTES NFR BLD AUTO: 7 %
NEUTROPHILS # BLD AUTO: 3.89 K/UL
NEUTROPHILS NFR BLD AUTO: 59.5 %
PLATELET # BLD AUTO: 225 K/UL
POTASSIUM SERPL-SCNC: 4 MMOL/L
PROT SERPL-MCNC: 7.2 G/DL
RBC # BLD: 4.71 M/UL
RBC # FLD: 13.5 %
SODIUM SERPL-SCNC: 141 MMOL/L
TRIGL SERPL-MCNC: 136 MG/DL
TSH SERPL-ACNC: 1.5 UIU/ML
WBC # FLD AUTO: 6.55 K/UL

## 2019-10-21 PROCEDURE — 93000 ELECTROCARDIOGRAM COMPLETE: CPT

## 2019-10-21 PROCEDURE — 99214 OFFICE O/P EST MOD 30 MIN: CPT | Mod: 25

## 2019-10-21 NOTE — HISTORY OF PRESENT ILLNESS
[FreeTextEntry1] : Patient is a 74 year old man with a history of a secundum ASD status post closure with a bovine pericardial patch 3/17/16, coronary artery disease status post LIMA to LAD and SVG to diagonal artery, resection of MARIEL, and left CryoMaze ablation 3/17/16 whose surgery was complicated by a cerebrovascular accident with residual hemiparesis, paroxysmal atrial fibrillation, atrial flutter status cardioversion 5/3/2016, HTN, and dyslipidemia who presents today for follow up of atrial fibrillation and HTN. He states that he has been feeling well, however, he has been eating a little more salt. He otherwise denies any chest pain, dyspnea, palpitations, headaches, and dizziness.

## 2019-10-21 NOTE — DISCUSSION/SUMMARY
[FreeTextEntry1] : IMPRESSION: Mr. Wakefield is a 74 year old man with a history of a secundum ASD status post closure with a bovine pericardial patch 3/17/2016, coronary artery disease status post 2-vessel CABG with LIMA to the LAD and SVG to the diagonal, resection of the left atrial appendage, and left CryoMaze ablation all on 3/17/2016 whose procedures were complicated by a cerebrovascular accident with residual weakness, paroxysmal atrial fibrillation, atrial flutter status post cardioversion 5/3/2016, HTN, and dyslipidemia who presents today for follow up of atrial fibrillation and HTN.\par \par PLAN:\par 1. He is currently in sinus rhythm, thus he will continue on Propranolol 10mg three times a day and Multaq 400mg twice daily. He will also continue on Xarelto 20mg daily for systemic anticoagulation. I will be checking a CBC and CMP today. \par 2. He will continue on ASA 81mg daily given his CAD. He will continue on Lipitor 10mg daily and I will be checking a CMP and lipid profile today. His LDL goal is at least less than 100, although ideally close to 70. 3. His blood pressure is good, thus he will continue on Lasix 20 mg daily and Propranolol.  \par 4. I have asked him to schedule an echocardiogram given the changes seen on his ECG today.\par 5. He will follow up with me in 1 month or sooner should he experience any symptoms in the interim.\par 6. He understands that he requires antibiotic prophylaxis for dental procedures.

## 2019-10-21 NOTE — PHYSICAL EXAM
[General Appearance - Well Developed] : well developed [Normal Appearance] : normal appearance [Well Groomed] : well groomed [General Appearance - Well Nourished] : well nourished [No Deformities] : no deformities [General Appearance - In No Acute Distress] : no acute distress [Normal Conjunctiva] : the conjunctiva exhibited no abnormalities [Normal Oral Mucosa] : normal oral mucosa [No Oral Pallor] : no oral pallor [No Oral Cyanosis] : no oral cyanosis [Normal Jugular Venous A Waves Present] : normal jugular venous A waves present [Normal Jugular Venous V Waves Present] : normal jugular venous V waves present [No Jugular Venous Jarvis A Waves] : no jugular venous jarvis A waves [Exaggerated Use Of Accessory Muscles For Inspiration] : no accessory muscle use [Respiration, Rhythm And Depth] : normal respiratory rhythm and effort [Auscultation Breath Sounds / Voice Sounds] : lungs were clear to auscultation bilaterally [Bowel Sounds] : normal bowel sounds [Abdomen Soft] : soft [Abdomen Tenderness] : non-tender [Abnormal Walk] : normal gait [Nail Clubbing] : no clubbing of the fingernails [Cyanosis, Localized] : no localized cyanosis [Petechial Hemorrhages (___cm)] : no petechial hemorrhages [Skin Color & Pigmentation] : normal skin color and pigmentation [] : no rash [No Skin Ulcers] : no skin ulcer [Oriented To Time, Place, And Person] : oriented to person, place, and time [Affect] : the affect was normal [Mood] : the mood was normal [No Anxiety] : not feeling anxious [Normal Rate] : normal [Rhythm Regular] : regular [Normal S2] : normal S2 [Normal S1] : normal S1 [I] : a grade 1 [No Pitting Edema] : no pitting edema present [FreeTextEntry1] : He has weakness of his left lower extremity. [Right Carotid Bruit] : no bruit heard over the right carotid [S3] : no S3 [Left Carotid Bruit] : no bruit heard over the left carotid [Bruit] : no bruit heard

## 2019-11-04 ENCOUNTER — RX RENEWAL (OUTPATIENT)
Age: 74
End: 2019-11-04

## 2019-11-20 ENCOUNTER — NON-APPOINTMENT (OUTPATIENT)
Age: 74
End: 2019-11-20

## 2019-11-20 ENCOUNTER — APPOINTMENT (OUTPATIENT)
Dept: CARDIOLOGY | Facility: CLINIC | Age: 74
End: 2019-11-20
Payer: MEDICARE

## 2019-11-20 VITALS
DIASTOLIC BLOOD PRESSURE: 70 MMHG | OXYGEN SATURATION: 97 % | WEIGHT: 200 LBS | BODY MASS INDEX: 29.62 KG/M2 | TEMPERATURE: 98.3 F | HEIGHT: 69 IN | RESPIRATION RATE: 12 BRPM | HEART RATE: 76 BPM | SYSTOLIC BLOOD PRESSURE: 137 MMHG

## 2019-11-20 PROCEDURE — 93306 TTE W/DOPPLER COMPLETE: CPT

## 2019-11-20 PROCEDURE — 93000 ELECTROCARDIOGRAM COMPLETE: CPT

## 2019-11-20 PROCEDURE — 99213 OFFICE O/P EST LOW 20 MIN: CPT

## 2019-11-20 NOTE — HISTORY OF PRESENT ILLNESS
[FreeTextEntry1] : Mr Wakefield arrives today for follow up of arrhythmia management.  He is feeling very well. He is maintaining sinus rhythm on Multaq. Excellent exercise tolerance, however still mild left left strength, stroke residual. Tolerating anticoagulation with no issues. Stroke residual left lower extremity weakness.,

## 2019-11-20 NOTE — DISCUSSION/SUMMARY
[FreeTextEntry1] : Mr Wakefield is a 71 y/o male with recent CABG x 2, ASD closure, MAZE, Post operative stroke with left hemiparesis, now almost resolved (left leg still feels weak). He presents in sinus rhythm.  He has been off  on Multaq and anticoagulation. \par NE winterval change LBBB, no CHF symptoms. Dr Molina has ordered an echo, \par No changes from EP stand point. See back in a year.

## 2019-12-26 ENCOUNTER — RX RENEWAL (OUTPATIENT)
Age: 74
End: 2019-12-26

## 2020-01-08 ENCOUNTER — APPOINTMENT (OUTPATIENT)
Dept: CARDIOLOGY | Facility: CLINIC | Age: 75
End: 2020-01-08
Payer: MEDICARE

## 2020-01-08 PROCEDURE — 93015 CV STRESS TEST SUPVJ I&R: CPT

## 2020-01-08 PROCEDURE — A9500: CPT

## 2020-01-08 PROCEDURE — 78452 HT MUSCLE IMAGE SPECT MULT: CPT

## 2020-01-27 ENCOUNTER — RX RENEWAL (OUTPATIENT)
Age: 75
End: 2020-01-27

## 2020-03-11 ENCOUNTER — RX RENEWAL (OUTPATIENT)
Age: 75
End: 2020-03-11

## 2020-04-13 ENCOUNTER — RX RENEWAL (OUTPATIENT)
Age: 75
End: 2020-04-13

## 2020-04-23 ENCOUNTER — RX RENEWAL (OUTPATIENT)
Age: 75
End: 2020-04-23

## 2020-05-11 ENCOUNTER — RX RENEWAL (OUTPATIENT)
Age: 75
End: 2020-05-11

## 2020-06-15 ENCOUNTER — RX RENEWAL (OUTPATIENT)
Age: 75
End: 2020-06-15

## 2020-06-25 ENCOUNTER — APPOINTMENT (OUTPATIENT)
Dept: CARDIOLOGY | Facility: CLINIC | Age: 75
End: 2020-06-25
Payer: MEDICARE

## 2020-06-25 ENCOUNTER — NON-APPOINTMENT (OUTPATIENT)
Age: 75
End: 2020-06-25

## 2020-06-25 VITALS
OXYGEN SATURATION: 98 % | DIASTOLIC BLOOD PRESSURE: 75 MMHG | HEART RATE: 88 BPM | HEIGHT: 69 IN | WEIGHT: 196 LBS | SYSTOLIC BLOOD PRESSURE: 144 MMHG | RESPIRATION RATE: 12 BRPM | BODY MASS INDEX: 29.03 KG/M2

## 2020-06-25 VITALS — SYSTOLIC BLOOD PRESSURE: 128 MMHG | DIASTOLIC BLOOD PRESSURE: 80 MMHG

## 2020-06-25 PROCEDURE — 93000 ELECTROCARDIOGRAM COMPLETE: CPT

## 2020-06-25 PROCEDURE — 99214 OFFICE O/P EST MOD 30 MIN: CPT | Mod: 25

## 2020-06-26 RX ORDER — AMOXICILLIN 500 MG/1
500 CAPSULE ORAL
Qty: 8 | Refills: 0 | Status: ACTIVE | COMMUNITY
Start: 2020-06-26 | End: 1900-01-01

## 2020-09-02 ENCOUNTER — RX RENEWAL (OUTPATIENT)
Age: 75
End: 2020-09-02

## 2020-09-12 ENCOUNTER — RX RENEWAL (OUTPATIENT)
Age: 75
End: 2020-09-12

## 2020-11-18 ENCOUNTER — APPOINTMENT (OUTPATIENT)
Dept: CARDIOLOGY | Facility: CLINIC | Age: 75
End: 2020-11-18
Payer: MEDICARE

## 2020-11-18 ENCOUNTER — NON-APPOINTMENT (OUTPATIENT)
Age: 75
End: 2020-11-18

## 2020-11-18 VITALS
WEIGHT: 199 LBS | HEIGHT: 69 IN | OXYGEN SATURATION: 97 % | BODY MASS INDEX: 29.47 KG/M2 | TEMPERATURE: 97.1 F | DIASTOLIC BLOOD PRESSURE: 76 MMHG | HEART RATE: 76 BPM | SYSTOLIC BLOOD PRESSURE: 160 MMHG | RESPIRATION RATE: 12 BRPM

## 2020-11-18 VITALS — SYSTOLIC BLOOD PRESSURE: 122 MMHG | DIASTOLIC BLOOD PRESSURE: 84 MMHG

## 2020-11-18 DIAGNOSIS — E55.9 VITAMIN D DEFICIENCY, UNSPECIFIED: ICD-10-CM

## 2020-11-18 LAB
25(OH)D3 SERPL-MCNC: 22 NG/ML
ALBUMIN SERPL ELPH-MCNC: 4.5 G/DL
ALP BLD-CCNC: 79 U/L
ALT SERPL-CCNC: 19 U/L
ANION GAP SERPL CALC-SCNC: 18 MMOL/L
AST SERPL-CCNC: 17 U/L
BASOPHILS # BLD AUTO: 0.02 K/UL
BASOPHILS NFR BLD AUTO: 0.3 %
BILIRUB SERPL-MCNC: 0.6 MG/DL
BUN SERPL-MCNC: 19 MG/DL
CALCIUM SERPL-MCNC: 9.2 MG/DL
CHLORIDE SERPL-SCNC: 107 MMOL/L
CHOLEST SERPL-MCNC: 157 MG/DL
CHOLEST/HDLC SERPL: 4.6 RATIO
CO2 SERPL-SCNC: 18 MMOL/L
CREAT SERPL-MCNC: 1.26 MG/DL
EOSINOPHIL # BLD AUTO: 0.15 K/UL
EOSINOPHIL NFR BLD AUTO: 2.3 %
GLUCOSE SERPL-MCNC: 134 MG/DL
HCT VFR BLD CALC: 44.5 %
HDLC SERPL-MCNC: 34 MG/DL
HGB BLD-MCNC: 14.3 G/DL
IMM GRANULOCYTES NFR BLD AUTO: 0.5 %
LDLC SERPL CALC-MCNC: 92 MG/DL
LYMPHOCYTES # BLD AUTO: 2.19 K/UL
LYMPHOCYTES NFR BLD AUTO: 33.8 %
MAN DIFF?: NORMAL
MCHC RBC-ENTMCNC: 30.7 PG
MCHC RBC-ENTMCNC: 32.1 GM/DL
MCV RBC AUTO: 95.5 FL
MONOCYTES # BLD AUTO: 0.48 K/UL
MONOCYTES NFR BLD AUTO: 7.4 %
NEUTROPHILS # BLD AUTO: 3.61 K/UL
NEUTROPHILS NFR BLD AUTO: 55.7 %
PLATELET # BLD AUTO: 218 K/UL
POTASSIUM SERPL-SCNC: 4.1 MMOL/L
PROT SERPL-MCNC: 7.2 G/DL
PSA SERPL-MCNC: 0.89 NG/ML
RBC # BLD: 4.66 M/UL
RBC # FLD: 13.5 %
SODIUM SERPL-SCNC: 143 MMOL/L
TRIGL SERPL-MCNC: 155 MG/DL
TSH SERPL-ACNC: 1.95 UIU/ML
WBC # FLD AUTO: 6.48 K/UL

## 2020-11-18 PROCEDURE — 99214 OFFICE O/P EST MOD 30 MIN: CPT | Mod: 25

## 2020-11-18 PROCEDURE — 93000 ELECTROCARDIOGRAM COMPLETE: CPT

## 2020-11-18 NOTE — HISTORY OF PRESENT ILLNESS
[FreeTextEntry1] : Patient is a 75 year old man with a history of a secundum ASD status post closure with a bovine pericardial patch 3/17/16, coronary artery disease status post LIMA to LAD and SVG to diagonal artery, resection of MARIEL, and left CryoMaze ablation 3/17/16 whose surgery was complicated by a cerebrovascular accident with residual hemiparesis, paroxysmal atrial fibrillation, atrial flutter status cardioversion 5/3/2016, HTN, and dyslipidemia who presents today for follow up of atrial fibrillation and coronary artery disease. He states that he has been feeling well, denying any chest pain, dyspnea, palpitations, headaches, and dizziness. He states that he has gained some weight as he has not been as active the past several months given the ongoing COVID pandemic and also given that he did not go to [place] this summer.

## 2020-11-18 NOTE — REASON FOR VISIT
[Hyperlipidemia] : hyperlipidemia [Hypertension] : hypertension [Atrial Fibrillation] : atrial fibrillation

## 2020-11-18 NOTE — PHYSICAL EXAM
[General Appearance - Well Developed] : well developed [Normal Appearance] : normal appearance [Well Groomed] : well groomed [General Appearance - Well Nourished] : well nourished [No Deformities] : no deformities [General Appearance - In No Acute Distress] : no acute distress [Normal Conjunctiva] : the conjunctiva exhibited no abnormalities [Normal Jugular Venous A Waves Present] : normal jugular venous A waves present [Normal Jugular Venous V Waves Present] : normal jugular venous V waves present [No Jugular Venous Jarvis A Waves] : no jugular venous jarvis A waves [Respiration, Rhythm And Depth] : normal respiratory rhythm and effort [Exaggerated Use Of Accessory Muscles For Inspiration] : no accessory muscle use [Auscultation Breath Sounds / Voice Sounds] : lungs were clear to auscultation bilaterally [Bowel Sounds] : normal bowel sounds [Abdomen Soft] : soft [Abdomen Tenderness] : non-tender [Abnormal Walk] : normal gait [Nail Clubbing] : no clubbing of the fingernails [Cyanosis, Localized] : no localized cyanosis [Petechial Hemorrhages (___cm)] : no petechial hemorrhages [Skin Color & Pigmentation] : normal skin color and pigmentation [] : no rash [No Skin Ulcers] : no skin ulcer [Oriented To Time, Place, And Person] : oriented to person, place, and time [Affect] : the affect was normal [Mood] : the mood was normal [No Anxiety] : not feeling anxious [Normal Rate] : normal [Rhythm Regular] : regular [Normal S1] : normal S1 [Normal S2] : normal S2 [I] : a grade 1 [No Pitting Edema] : no pitting edema present [FreeTextEntry1] : He has weakness of his left lower extremity. [S3] : no S3 [Right Carotid Bruit] : no bruit heard over the right carotid [Left Carotid Bruit] : no bruit heard over the left carotid [Bruit] : no bruit heard

## 2020-11-18 NOTE — DISCUSSION/SUMMARY
[FreeTextEntry1] : IMPRESSION: Mr. Wakefield is a 75 year old man with a history of a secundum ASD status post closure with a bovine pericardial patch 3/17/2016, coronary artery disease status post 2-vessel CABG with LIMA to the LAD and SVG to the diagonal, resection of the left atrial appendage, and left CryoMaze ablation all on 3/17/2016 whose procedures were complicated by a cerebrovascular accident with residual weakness, paroxysmal atrial fibrillation, atrial flutter status post cardioversion 5/3/2016, HTN, and dyslipidemia who presents today for follow up of atrial fibrillation and coronary artery disease.\par \par PLAN:\par 1. He is currently in sinus rhythm, thus he will continue on Propranolol 10mg three times a day and Multaq 400mg twice daily. He will also continue on Xarelto 20mg daily for systemic anticoagulation. I will be checking a CBC and CMP today. \par 2. He will continue on ASA 81mg daily given his CAD. He will continue on Lipitor 10mg daily and I will be checking a CMP and lipid profile today. His LDL goal is at least less than 100, although ideally close to 70. His most recent LDL had trended up. He had a nonspecific IVCD on his initial ECG, which resolved on the repeat EKG. I have asked him to schedule an echocardiogram. Should he develop any symptoms, we can consider a left heart cath given his previously abnormal nuclear stress test about 5 months ago that revealed areas of ischemia. Given the ongoing COVID pandemic, we have decided to hold off on further evaluation at this time as well.                                                                                                                           \par 3. His blood pressure was good when it was repeated, thus he will continue on Lasix 20 mg daily and Propranolol.  \par 4. He will follow up with me in about 4 months or sooner should he experience any symptoms in the interim.\par 5. He understands that he requires antibiotic prophylaxis for dental procedures. \par 6. He refused a flu shot today.

## 2020-11-18 NOTE — DISCUSSION/SUMMARY
[FreeTextEntry1] : IMPRESSION: Mr. Wakefield is a 74 year old man with a history of a secundum ASD status post closure with a bovine pericardial patch 3/17/2016, coronary artery disease status post 2-vessel CABG with LIMA to the LAD and SVG to the diagonal, resection of the left atrial appendage, and left CryoMaze ablation all on 3/17/2016 whose procedures were complicated by a cerebrovascular accident with residual weakness, paroxysmal atrial fibrillation, atrial flutter status post cardioversion 5/3/2016, HTN, and dyslipidemia who presents today for follow up of atrial fibrillation and HTN.\par \par PLAN:\par 1. He is currently in sinus rhythm, thus he will continue on Propranolol 10mg three times a day and Multaq 400mg twice daily. He will also continue on Xarelto 20mg daily for systemic anticoagulation. I will be checking a CBC and CMP today. \par 2. He will continue on ASA 81mg daily given his CAD. He will continue on Lipitor 10mg daily and I will be checking a CMP and lipid profile today. His LDL goal is at least less than 100, although ideally close to 70. \par 3. His blood pressure is good, thus he will continue on Lasix 20 mg daily and Propranolol.  \par 4. He had a nuclear stress test about 5 months ago that revealed areas of ischemia, however, given that he has been feeling well and the ongoing COVID pandemic, we have decided to hold off on further evaluation at this time.                                                                                                                                        5. He will follow up with me in about 4 months or sooner should he experience any symptoms in the interim.\par 6. He understands that he requires antibiotic prophylaxis for dental procedures.

## 2020-11-18 NOTE — HISTORY OF PRESENT ILLNESS
[FreeTextEntry1] : Patient is a 74 year old man with a history of a secundum ASD status post closure with a bovine pericardial patch 3/17/16, coronary artery disease status post LIMA to LAD and SVG to diagonal artery, resection of MARIEL, and left CryoMaze ablation 3/17/16 whose surgery was complicated by a cerebrovascular accident with residual hemiparesis, paroxysmal atrial fibrillation, atrial flutter status cardioversion 5/3/2016, HTN, and dyslipidemia who presents today for follow up of atrial fibrillation and HTN. He states that he has been feeling well, denying any chest pain, dyspnea, palpitations, headaches, and dizziness.

## 2020-11-19 LAB
BASOPHILS # BLD AUTO: 0.03 K/UL
BASOPHILS NFR BLD AUTO: 0.4 %
EOSINOPHIL # BLD AUTO: 0.14 K/UL
EOSINOPHIL NFR BLD AUTO: 1.9 %
ESTIMATED AVERAGE GLUCOSE: 111 MG/DL
HBA1C MFR BLD HPLC: 5.5 %
HCT VFR BLD CALC: 47.6 %
HGB BLD-MCNC: 14.8 G/DL
IMM GRANULOCYTES NFR BLD AUTO: 0.5 %
LYMPHOCYTES # BLD AUTO: 2.43 K/UL
LYMPHOCYTES NFR BLD AUTO: 32.8 %
MAN DIFF?: NORMAL
MCHC RBC-ENTMCNC: 30.4 PG
MCHC RBC-ENTMCNC: 31.1 GM/DL
MCV RBC AUTO: 97.7 FL
MONOCYTES # BLD AUTO: 0.52 K/UL
MONOCYTES NFR BLD AUTO: 7 %
NEUTROPHILS # BLD AUTO: 4.24 K/UL
NEUTROPHILS NFR BLD AUTO: 57.4 %
PLATELET # BLD AUTO: 225 K/UL
RBC # BLD: 4.87 M/UL
RBC # FLD: 13.8 %
WBC # FLD AUTO: 7.4 K/UL

## 2020-12-11 LAB
25(OH)D3 SERPL-MCNC: 20.1 NG/ML
ALBUMIN SERPL ELPH-MCNC: 4.9 G/DL
ALP BLD-CCNC: 95 U/L
ALT SERPL-CCNC: 20 U/L
ANION GAP SERPL CALC-SCNC: 16 MMOL/L
AST SERPL-CCNC: 18 U/L
BILIRUB SERPL-MCNC: 0.8 MG/DL
BUN SERPL-MCNC: 21 MG/DL
CALCIUM SERPL-MCNC: 9.5 MG/DL
CHLORIDE SERPL-SCNC: 100 MMOL/L
CHOLEST SERPL-MCNC: 159 MG/DL
CO2 SERPL-SCNC: 25 MMOL/L
CREAT SERPL-MCNC: 1.23 MG/DL
GLUCOSE SERPL-MCNC: 87 MG/DL
HDLC SERPL-MCNC: 40 MG/DL
LDLC SERPL CALC-MCNC: 93 MG/DL
NONHDLC SERPL-MCNC: 119 MG/DL
POTASSIUM SERPL-SCNC: 4.2 MMOL/L
PROT SERPL-MCNC: 7.6 G/DL
SODIUM SERPL-SCNC: 140 MMOL/L
TRIGL SERPL-MCNC: 128 MG/DL
TSH SERPL-ACNC: 2.02 UIU/ML

## 2021-01-08 ENCOUNTER — APPOINTMENT (OUTPATIENT)
Dept: CARDIOLOGY | Facility: CLINIC | Age: 76
End: 2021-01-08
Payer: MEDICARE

## 2021-01-08 PROCEDURE — 93306 TTE W/DOPPLER COMPLETE: CPT

## 2021-04-07 ENCOUNTER — APPOINTMENT (OUTPATIENT)
Dept: CARDIOLOGY | Facility: CLINIC | Age: 76
End: 2021-04-07
Payer: MEDICARE

## 2021-04-07 ENCOUNTER — NON-APPOINTMENT (OUTPATIENT)
Age: 76
End: 2021-04-07

## 2021-04-07 VITALS
WEIGHT: 198 LBS | BODY MASS INDEX: 29.33 KG/M2 | DIASTOLIC BLOOD PRESSURE: 73 MMHG | OXYGEN SATURATION: 95 % | TEMPERATURE: 98 F | HEIGHT: 69 IN | HEART RATE: 88 BPM | SYSTOLIC BLOOD PRESSURE: 130 MMHG

## 2021-04-07 DIAGNOSIS — K21.9 GASTRO-ESOPHAGEAL REFLUX DISEASE W/OUT ESOPHAGITIS: ICD-10-CM

## 2021-04-07 PROCEDURE — 99214 OFFICE O/P EST MOD 30 MIN: CPT | Mod: 25

## 2021-04-07 PROCEDURE — 93000 ELECTROCARDIOGRAM COMPLETE: CPT

## 2021-04-12 ENCOUNTER — NON-APPOINTMENT (OUTPATIENT)
Age: 76
End: 2021-04-12

## 2021-04-12 NOTE — HISTORY OF PRESENT ILLNESS
[FreeTextEntry1] : Patient is a 75 year old man with a history of a secundum ASD status post closure with a bovine pericardial patch 3/17/16, coronary artery disease status post LIMA to LAD and SVG to diagonal artery, resection of MARIEL, and left CryoMaze ablation 3/17/16 whose surgery was complicated by a cerebrovascular accident with residual hemiparesis, paroxysmal atrial fibrillation, atrial flutter status cardioversion 5/3/2016, HTN, and dyslipidemia who presents today for follow up of atrial fibrillation and coronary artery disease. He states that he has been feeling well, denying any chest pain, dyspnea, palpitations, headaches, and dizziness. He got both doses of the COVID vaccine. After he has dinner he has noticed some mild acid reflux where he feels like he has to clear his throat.

## 2021-04-12 NOTE — PHYSICAL EXAM
[General Appearance - Well Developed] : well developed [Normal Appearance] : normal appearance [Well Groomed] : well groomed [General Appearance - Well Nourished] : well nourished [No Deformities] : no deformities [General Appearance - In No Acute Distress] : no acute distress [Normal Conjunctiva] : the conjunctiva exhibited no abnormalities [Normal Jugular Venous A Waves Present] : normal jugular venous A waves present [Normal Jugular Venous V Waves Present] : normal jugular venous V waves present [Respiration, Rhythm And Depth] : normal respiratory rhythm and effort [Exaggerated Use Of Accessory Muscles For Inspiration] : no accessory muscle use [Auscultation Breath Sounds / Voice Sounds] : lungs were clear to auscultation bilaterally [Normal Rate] : normal [Rhythm Regular] : regular [Normal S1] : normal S1 [Normal S2] : normal S2 [No Pitting Edema] : no pitting edema present [Abdomen Soft] : soft [Abdomen Tenderness] : non-tender [Nail Clubbing] : no clubbing of the fingernails [Cyanosis, Localized] : no localized cyanosis [Petechial Hemorrhages (___cm)] : no petechial hemorrhages [Skin Color & Pigmentation] : normal skin color and pigmentation [] : no rash [Oriented To Time, Place, And Person] : oriented to person, place, and time [Affect] : the affect was normal [Mood] : the mood was normal [No Anxiety] : not feeling anxious [II] : a grade 2 [I] : a grade 1 [Bowel Sounds] : normal bowel sounds [No Skin Ulcers] : no skin ulcer [S3] : no S3 [Right Carotid Bruit] : no bruit heard over the right carotid [Left Carotid Bruit] : no bruit heard over the left carotid [Bruit] : no bruit heard [FreeTextEntry1] : He has weakness of his left lower extremity resulting in a slow gait.

## 2021-04-12 NOTE — DISCUSSION/SUMMARY
[FreeTextEntry1] : IMPRESSION: Mr. Wakefield is a 75 year old man with a history of a secundum ASD status post closure with a bovine pericardial patch 3/17/2016, coronary artery disease status post 2-vessel CABG with LIMA to the LAD and SVG to the diagonal, resection of the left atrial appendage, and left CryoMaze ablation all on 3/17/2016 whose procedures were complicated by a cerebrovascular accident with residual weakness, paroxysmal atrial fibrillation, atrial flutter status post cardioversion 5/3/2016, HTN, and dyslipidemia who presents today for follow up of atrial fibrillation and coronary artery disease.\par \par PLAN:\par 1. He is currently in sinus rhythm on his ECG, thus he will continue on Propranolol 10 mg three times a day and Multaq 400 mg twice daily. He will also continue on Xarelto 20mg daily for systemic anticoagulation. I will be checking a CBC and CMP today. \par 2. He will continue on ASA 81mg daily given his CAD status post CABG. He will continue on Lipitor 10 mg daily and I will be checking a CMP and lipid profile today. His LDL goal is at least less than 100, although ideally close to 70. His most recent LDL had been okay, but still above goal. His echocardiogram done 3 months ago showed stable low normal LVEF.  Should he develop any symptoms, we can consider a left heart cath given his previously abnormal nuclear stress test about one year ago that revealed areas of ischemia. Given the ongoing COVID pandemic, we have decided to hold off on further evaluation at this time as well.  He will be more inclined to have a further workup in the future as he has received the COVID vaccine.               \par 3. His blood pressure is good, thus he will continue on Lasix 20 mg daily and Propranolol.  \par 4. He will follow up with me in about 4 months or sooner should he experience any symptoms in the interim.\par 5. He understands that he requires antibiotic prophylaxis for dental procedures. \par 6. He will start on Pepcid 20 mg twice daily for acid reflux. If no improvement, we can consider changing to a PPI in the morning and Pepcid in the evening.\par 7. I will be checking COVID antibodies today.

## 2021-09-27 ENCOUNTER — RX RENEWAL (OUTPATIENT)
Age: 76
End: 2021-09-27

## 2021-12-15 ENCOUNTER — NON-APPOINTMENT (OUTPATIENT)
Age: 76
End: 2021-12-15

## 2021-12-15 ENCOUNTER — APPOINTMENT (OUTPATIENT)
Dept: CARDIOLOGY | Facility: CLINIC | Age: 76
End: 2021-12-15
Payer: MEDICARE

## 2021-12-15 VITALS
OXYGEN SATURATION: 98 % | SYSTOLIC BLOOD PRESSURE: 157 MMHG | HEART RATE: 79 BPM | DIASTOLIC BLOOD PRESSURE: 90 MMHG | TEMPERATURE: 96.3 F | RESPIRATION RATE: 12 BRPM

## 2021-12-15 VITALS — WEIGHT: 194 LBS | BODY MASS INDEX: 28.65 KG/M2

## 2021-12-15 VITALS — SYSTOLIC BLOOD PRESSURE: 134 MMHG | DIASTOLIC BLOOD PRESSURE: 84 MMHG

## 2021-12-15 PROCEDURE — 99214 OFFICE O/P EST MOD 30 MIN: CPT | Mod: 25

## 2021-12-15 PROCEDURE — 93000 ELECTROCARDIOGRAM COMPLETE: CPT

## 2021-12-16 DIAGNOSIS — Z00.00 ENCOUNTER FOR GENERAL ADULT MEDICAL EXAMINATION W/OUT ABNORMAL FINDINGS: ICD-10-CM

## 2021-12-18 ENCOUNTER — RX RENEWAL (OUTPATIENT)
Age: 76
End: 2021-12-18

## 2021-12-20 NOTE — HISTORY OF PRESENT ILLNESS
[FreeTextEntry1] : Patient is a 76 year old man with a history of a secundum ASD status post closure with a bovine pericardial patch 3/17/16, coronary artery disease status post LIMA to LAD and SVG to diagonal artery, resection of MARIEL, and left CryoMaze ablation 3/17/16 whose surgery was complicated by a cerebrovascular accident with residual hemiparesis, paroxysmal atrial fibrillation, atrial flutter status cardioversion 5/3/2016, HTN, and dyslipidemia who presents today for follow up of atrial fibrillation and coronary artery disease. He states that he has been feeling well, denying any chest pain, dyspnea, palpitations, headaches, and dizziness.

## 2021-12-20 NOTE — DISCUSSION/SUMMARY
[FreeTextEntry1] : IMPRESSION: Mr. Wakefield is a 76 year old man with a history of a secundum ASD status post closure with a bovine pericardial patch 3/17/2016, coronary artery disease status post 2-vessel CABG with LIMA to the LAD and SVG to the diagonal, resection of the left atrial appendage, and left CryoMaze ablation all on 3/17/2016 whose procedures were complicated by a cerebrovascular accident with residual weakness, paroxysmal atrial fibrillation, atrial flutter status post cardioversion 5/3/2016, HTN, and dyslipidemia who presents today for follow up of atrial fibrillation and coronary artery disease.\par \par PLAN:\par 1. He is currently in sinus rhythm on his ECG, thus he will continue on Propranolol 10 mg three times a day and Multaq 400 mg twice daily. He will also continue on Xarelto 20mg daily for systemic anticoagulation. I will be checking a CBC and CMP today. \par 2. He will continue on ASA 81 mg daily given his CAD status post CABG. He will continue on Lipitor 10 mg daily and I will be checking a CMP and lipid profile today. His LDL goal is at least less than 100, although ideally close to 70. His most recent LDL had been okay, but still above goal. His echocardiogram done about one year ago showed stable low normal LVEF.  He will schedule a follow up echocardiogram.           \par 3. His blood pressure is OK, thus he will continue on Lasix 20 mg daily and Propranolol.  \par 4. He will follow up with me in about 4 months or sooner should he experience any symptoms in the interim. He will also see EP at that time given his atrial fibrillation.\par 5. He understands that he requires antibiotic prophylaxis for dental procedures. \par 6. He had an abnormal nuclear stress test almost 2 years ago, however, he wants to hold off on further ischemic evaluation as he has been feeling fine.

## 2021-12-20 NOTE — PHYSICAL EXAM
[General Appearance - Well Developed] : well developed [Normal Appearance] : normal appearance [Well Groomed] : well groomed [General Appearance - Well Nourished] : well nourished [No Deformities] : no deformities [General Appearance - In No Acute Distress] : no acute distress [Normal Conjunctiva] : the conjunctiva exhibited no abnormalities [Normal Jugular Venous A Waves Present] : normal jugular venous A waves present [Normal Jugular Venous V Waves Present] : normal jugular venous V waves present [Respiration, Rhythm And Depth] : normal respiratory rhythm and effort [Exaggerated Use Of Accessory Muscles For Inspiration] : no accessory muscle use [Auscultation Breath Sounds / Voice Sounds] : lungs were clear to auscultation bilaterally [Normal Rate] : normal [Rhythm Regular] : regular [Normal S1] : normal S1 [Normal S2] : normal S2 [II] : a grade 2 [I] : a grade 1 [No Pitting Edema] : no pitting edema present [Bowel Sounds] : normal bowel sounds [Abdomen Soft] : soft [Abdomen Tenderness] : non-tender [Nail Clubbing] : no clubbing of the fingernails [Cyanosis, Localized] : no localized cyanosis [Petechial Hemorrhages (___cm)] : no petechial hemorrhages [Skin Color & Pigmentation] : normal skin color and pigmentation [] : no rash [No Skin Ulcers] : no skin ulcer [Oriented To Time, Place, And Person] : oriented to person, place, and time [Affect] : the affect was normal [Mood] : the mood was normal [No Anxiety] : not feeling anxious [S3] : no S3 [Right Carotid Bruit] : no bruit heard over the right carotid [Left Carotid Bruit] : no bruit heard over the left carotid [Bruit] : no bruit heard [FreeTextEntry1] : He has weakness of his left lower extremity resulting in a slow gait.

## 2021-12-20 NOTE — CARDIOLOGY SUMMARY
[LVEF ___%] : LVEF [unfilled]% [None] : no pulmonary hypertension [Mild] : mild mitral regurgitation [___] : [unfilled] [de-identified] : 12/15/2021: Sinus Rhythm at 80 bpm with an incomplete right bundle branch block, old anterior infarct and old inferior infarct\par

## 2022-01-26 ENCOUNTER — RX RENEWAL (OUTPATIENT)
Age: 77
End: 2022-01-26

## 2022-05-19 ENCOUNTER — NON-APPOINTMENT (OUTPATIENT)
Age: 77
End: 2022-05-19

## 2022-05-19 ENCOUNTER — APPOINTMENT (OUTPATIENT)
Dept: CARDIOLOGY | Facility: CLINIC | Age: 77
End: 2022-05-19
Payer: MEDICARE

## 2022-05-19 ENCOUNTER — APPOINTMENT (OUTPATIENT)
Dept: ELECTROPHYSIOLOGY | Facility: CLINIC | Age: 77
End: 2022-05-19
Payer: MEDICARE

## 2022-05-19 VITALS
RESPIRATION RATE: 12 BRPM | HEART RATE: 86 BPM | DIASTOLIC BLOOD PRESSURE: 77 MMHG | BODY MASS INDEX: 29.47 KG/M2 | OXYGEN SATURATION: 96 % | SYSTOLIC BLOOD PRESSURE: 161 MMHG | WEIGHT: 199 LBS | HEIGHT: 69 IN | TEMPERATURE: 96.9 F

## 2022-05-19 VITALS — DIASTOLIC BLOOD PRESSURE: 86 MMHG | SYSTOLIC BLOOD PRESSURE: 126 MMHG

## 2022-05-19 LAB
25(OH)D3 SERPL-MCNC: 19.7 NG/ML
ALBUMIN SERPL ELPH-MCNC: 4.6 G/DL
ALP BLD-CCNC: 87 U/L
ALT SERPL-CCNC: 23 U/L
ANION GAP SERPL CALC-SCNC: 13 MMOL/L
AST SERPL-CCNC: 18 U/L
BASOPHILS # BLD AUTO: 0.02 K/UL
BASOPHILS NFR BLD AUTO: 0.3 %
BILIRUB SERPL-MCNC: 0.8 MG/DL
BUN SERPL-MCNC: 20 MG/DL
CALCIUM SERPL-MCNC: 9.6 MG/DL
CHLORIDE SERPL-SCNC: 103 MMOL/L
CHOLEST SERPL-MCNC: 185 MG/DL
CO2 SERPL-SCNC: 24 MMOL/L
COVID-19 SPIKE DOMAIN ANTIBODY INTERPRETATION: POSITIVE
CREAT SERPL-MCNC: 1.23 MG/DL
EOSINOPHIL # BLD AUTO: 0.16 K/UL
EOSINOPHIL NFR BLD AUTO: 2.3 %
ESTIMATED AVERAGE GLUCOSE: 108 MG/DL
GLUCOSE SERPL-MCNC: 91 MG/DL
HBA1C MFR BLD HPLC: 5.4 %
HCT VFR BLD CALC: 49.2 %
HDLC SERPL-MCNC: 38 MG/DL
HGB BLD-MCNC: 15.4 G/DL
IMM GRANULOCYTES NFR BLD AUTO: 0.4 %
LDLC SERPL CALC-MCNC: 123 MG/DL
LYMPHOCYTES # BLD AUTO: 2.13 K/UL
LYMPHOCYTES NFR BLD AUTO: 30.6 %
MAN DIFF?: NORMAL
MCHC RBC-ENTMCNC: 30.4 PG
MCHC RBC-ENTMCNC: 31.3 GM/DL
MCV RBC AUTO: 97 FL
MONOCYTES # BLD AUTO: 0.51 K/UL
MONOCYTES NFR BLD AUTO: 7.3 %
NEUTROPHILS # BLD AUTO: 4.11 K/UL
NEUTROPHILS NFR BLD AUTO: 59.1 %
NONHDLC SERPL-MCNC: 147 MG/DL
PLATELET # BLD AUTO: 234 K/UL
POTASSIUM SERPL-SCNC: 4.2 MMOL/L
PROT SERPL-MCNC: 7.4 G/DL
PSA SERPL-MCNC: 0.95 NG/ML
RBC # BLD: 5.07 M/UL
RBC # FLD: 13.7 %
SARS-COV-2 AB SERPL IA-ACNC: 216 U/ML
SODIUM SERPL-SCNC: 140 MMOL/L
TRIGL SERPL-MCNC: 122 MG/DL
TSH SERPL-ACNC: 2.66 UIU/ML
WBC # FLD AUTO: 6.96 K/UL

## 2022-05-19 PROCEDURE — 99213 OFFICE O/P EST LOW 20 MIN: CPT

## 2022-05-19 PROCEDURE — 93306 TTE W/DOPPLER COMPLETE: CPT

## 2022-05-19 PROCEDURE — 99214 OFFICE O/P EST MOD 30 MIN: CPT | Mod: 25

## 2022-05-19 PROCEDURE — 93000 ELECTROCARDIOGRAM COMPLETE: CPT

## 2022-05-19 NOTE — CARDIOLOGY SUMMARY
[de-identified] : TTE 1/8/2021\par MAC with mild MR\par Moderate LAE\par Mlid concentric LVH\par Endocardium not well visualized; grossly low normal LV EF with inferior hypokinesis.  Paradoxical septal motion c/w prior OHS.\par RVE with decreased RV function.\par Mild to moderate TR. \par No flow across the ASD closure device.

## 2022-05-19 NOTE — HISTORY OF PRESENT ILLNESS
[FreeTextEntry1] : 76 year old man with a history of a secundum ASD status post closure with a bovine pericardial patch 3/17/16, coronary artery disease status post LIMA to LAD and SVG to diagonal artery, resection of MARIEL, and left CryoMaze ablation 3/17/16 whose surgery was complicated by a cerebrovascular accident with residual hemiparesis, paroxysmal atrial fibrillation, atrial flutter status cardioversion 5/3/2016, HTN, and dyslipidemia.\par \par He is on Multaq for arrhythmia prevention, but does not recall symptoms beforehand.  Review of the chart is most consistent with this medication being started during a hospitalization at [facility] for cholecystitis, ie hospitalized in 12/2017 with subsequent renewals in [employer].  \par \par Currently he denies palpitations.  He has no complaints and has been living his life without limitations.

## 2022-05-19 NOTE — DISCUSSION/SUMMARY
[FreeTextEntry1] : 76 year old man with history of atrial arrhythmias in the setting of CAD and ASD repair.  He demonstrated post CABG arrhythmia and was transiently on amiodarone.  Currently on Multaq.  We discussed that AF is not uncommon in the setting of acute illness or surgery (cholecystitis/cholecystectomy).   THat is, he may not need long term AA therapy.  We will reassess this when he returns from [facility], ie stop the medication and proceed with outpatient monitoring (screening Zio).  \par \par We also discussed oral AC.  He has his MARIEL surgically addressed at the time of his surgery. We discussed that there is little data comparing oral AC to surgical MARIEL exclusion/excision. We will continue Xarelto for now.  We may consider imaging of the appendage (eg CT) to assess for complete occlusion.

## 2022-05-20 LAB
25(OH)D3 SERPL-MCNC: 16.1 NG/ML
ALBUMIN SERPL ELPH-MCNC: 4.5 G/DL
ALP BLD-CCNC: 82 U/L
ALT SERPL-CCNC: 20 U/L
ANION GAP SERPL CALC-SCNC: 13 MMOL/L
AST SERPL-CCNC: 23 U/L
BASOPHILS # BLD AUTO: 0.02 K/UL
BASOPHILS NFR BLD AUTO: 0.3 %
BILIRUB SERPL-MCNC: 0.7 MG/DL
BUN SERPL-MCNC: 17 MG/DL
CALCIUM SERPL-MCNC: 9 MG/DL
CHLORIDE SERPL-SCNC: 104 MMOL/L
CHOLEST SERPL-MCNC: 224 MG/DL
CO2 SERPL-SCNC: 25 MMOL/L
CREAT SERPL-MCNC: 1.05 MG/DL
EGFR: 73 ML/MIN/1.73M2
EOSINOPHIL # BLD AUTO: 0.12 K/UL
EOSINOPHIL NFR BLD AUTO: 1.9 %
GLUCOSE SERPL-MCNC: 98 MG/DL
HCT VFR BLD CALC: 45.8 %
HDLC SERPL-MCNC: 36 MG/DL
HGB BLD-MCNC: 15.1 G/DL
IMM GRANULOCYTES NFR BLD AUTO: 0.5 %
LDLC SERPL CALC-MCNC: 151 MG/DL
LYMPHOCYTES # BLD AUTO: 1.79 K/UL
LYMPHOCYTES NFR BLD AUTO: 27.8 %
MAN DIFF?: NORMAL
MCHC RBC-ENTMCNC: 31.1 PG
MCHC RBC-ENTMCNC: 33 GM/DL
MCV RBC AUTO: 94.2 FL
MONOCYTES # BLD AUTO: 0.58 K/UL
MONOCYTES NFR BLD AUTO: 9 %
NEUTROPHILS # BLD AUTO: 3.91 K/UL
NEUTROPHILS NFR BLD AUTO: 60.5 %
NONHDLC SERPL-MCNC: 188 MG/DL
PLATELET # BLD AUTO: 210 K/UL
POTASSIUM SERPL-SCNC: 4.4 MMOL/L
PROT SERPL-MCNC: 7.3 G/DL
RBC # BLD: 4.86 M/UL
RBC # FLD: 14.1 %
SODIUM SERPL-SCNC: 142 MMOL/L
TRIGL SERPL-MCNC: 184 MG/DL
TSH SERPL-ACNC: 2.15 UIU/ML
WBC # FLD AUTO: 6.45 K/UL

## 2022-05-23 NOTE — CARDIOLOGY SUMMARY
[LVEF ___%] : LVEF [unfilled]% [None] : no pulmonary hypertension [Mild] : mild mitral regurgitation [___] : [unfilled] [de-identified] : 5/19/2022: Sinus Rhythm at 84 bpm with a nonspecific IVCD, left anterior fascicular block, old anteroseptal infarct, and lateral infarct\par  [de-identified] : 5/19/2022: Technically difficult study.\par Mild mitral regurgitation. Mild aortic stenosis with an ANÍBAL= 2 sqcm. Mild aortic regurgitation. Severely dilated left atrium. There is no obvious flow seen across the atrial septal closure device.  Mild concentric left ventricular hypertrophy. Endocardium not well visualized; grossly mild segmental left ventricular systolic dysfunction with hypokinesis of the inferior wall. There is paradoxical septal motion, likely secondary to prior cardiac surgery.  Moderate diastolic dysfunction. Moderate right atrial enlargement. Right ventricular enlargement with decreased right ventricular systolic function. No pulmonary HTN with PASP= 31 mm Hg. Mild tricuspid regurgitation.

## 2022-05-23 NOTE — DISCUSSION/SUMMARY
[FreeTextEntry1] : IMPRESSION: Mr. Wakefield is a 76 year old man with a history of a secundum ASD status post closure with a bovine pericardial patch 3/17/2016, coronary artery disease status post 2-vessel CABG with LIMA to the LAD and SVG to the diagonal, resection of the left atrial appendage, and left CryoMaze ablation all on 3/17/2016 whose procedures were complicated by a cerebrovascular accident with residual weakness, paroxysmal atrial fibrillation, atrial flutter status post cardioversion 5/3/2016, HTN, and dyslipidemia who presents today for follow up of atrial fibrillation and coronary artery disease.\par \par PLAN:\par 1. He is currently in sinus rhythm on his ECG, thus he will continue on Propranolol 10 mg three times a day and Multaq 400 mg twice daily. He will also continue on Xarelto 20mg daily for systemic anticoagulation. I will be checking a CBC and CMP today. He again has a nonspecific IVCD on his ECG. He will see EP today.\par 2. He will continue on ASA 81 mg daily given his CAD status post CABG. He will continue on Lipitor 10 mg daily and I will be checking a CMP and lipid profile today. His LDL goal is less than 70. His most recent LDL was above goal. If it remains elevated, then will need to increase the dose of his Atorvastatin. \par 3. He had an echocardiogram performed today and his ejection fraction appears lower. I have asked him to schedule a pharmacologic nuclear stress test to evaluate for any significant ischemia.   \par 4. His blood pressure was good when it was repeated at the time of the physical examination, thus he will continue on Lasix 20 mg daily and Propranolol.  \par 5. He will follow up with me in about 4-6 months or sooner should he experience any symptoms in the interim or should there be any significant ischemia on his nuclear stress test.\par 6. He understands that he requires antibiotic prophylaxis for dental procedures.

## 2022-05-25 ENCOUNTER — APPOINTMENT (OUTPATIENT)
Dept: CARDIOLOGY | Facility: CLINIC | Age: 77
End: 2022-05-25

## 2022-05-25 PROCEDURE — 93015 CV STRESS TEST SUPVJ I&R: CPT

## 2022-05-25 PROCEDURE — 78452 HT MUSCLE IMAGE SPECT MULT: CPT

## 2022-05-25 PROCEDURE — A9500: CPT

## 2022-10-20 ENCOUNTER — APPOINTMENT (OUTPATIENT)
Dept: CARDIOLOGY | Facility: CLINIC | Age: 77
End: 2022-10-20
Payer: MEDICARE

## 2022-10-20 ENCOUNTER — NON-APPOINTMENT (OUTPATIENT)
Age: 77
End: 2022-10-20

## 2022-10-20 ENCOUNTER — APPOINTMENT (OUTPATIENT)
Dept: ELECTROPHYSIOLOGY | Facility: CLINIC | Age: 77
End: 2022-10-20

## 2022-10-20 VITALS
SYSTOLIC BLOOD PRESSURE: 135 MMHG | RESPIRATION RATE: 12 BRPM | HEART RATE: 111 BPM | WEIGHT: 190 LBS | BODY MASS INDEX: 28.06 KG/M2 | DIASTOLIC BLOOD PRESSURE: 94 MMHG | OXYGEN SATURATION: 98 %

## 2022-10-20 PROCEDURE — 99214 OFFICE O/P EST MOD 30 MIN: CPT | Mod: 25

## 2022-10-20 PROCEDURE — 93000 ELECTROCARDIOGRAM COMPLETE: CPT

## 2022-10-20 PROCEDURE — 99213 OFFICE O/P EST LOW 20 MIN: CPT

## 2022-10-21 NOTE — CARDIOLOGY SUMMARY
[de-identified] : Today: Atrial flutter with 2:1 AV block, IVCD  [de-identified] : TTE 1/8/2021\par MAC with mild MR\par Moderate LAE\par Mlid concentric LVH\par Endocardium not well visualized; grossly low normal LV EF with inferior hypokinesis.  Paradoxical septal motion c/w prior OHS.\par RVE with decreased RV function.\par Mild to moderate TR. \par No flow across the ASD closure device.

## 2022-10-21 NOTE — DISCUSSION/SUMMARY
[FreeTextEntry1] : 77 year old man with history of atrial arrhythmias in the setting of CAD and ASD repair.  He demonstrated post CABG arrhythmia and was transiently on amiodarone.  He is in atrial flutter today despite antiarrhythmic therapy with Multaq.  He is asymptomatic and based on data from PALLAS trial we will stop Multaq in the setting of persistent atrial arrhythmia.  To try to achieve better rate control we will double his propranolol.  We discussed possible elective ablation.  At this time we will proceed with JULIANE (can be used to assess the adequacy of MARIEL surgery) guided DCCV.

## 2022-10-21 NOTE — PHYSICAL EXAM
[Well Developed] : well developed [Well Nourished] : well nourished [No Acute Distress] : no acute distress [Normal Conjunctiva] : normal conjunctiva [No Carotid Bruit] : no carotid bruit [Normal S1, S2] : normal S1, S2 [No Murmur] : no murmur [No Rub] : no rub [No Gallop] : no gallop [Clear Lung Fields] : clear lung fields [Good Air Entry] : good air entry [No Respiratory Distress] : no respiratory distress  [Soft] : abdomen soft [Non Tender] : non-tender [No Masses/organomegaly] : no masses/organomegaly [Normal Bowel Sounds] : normal bowel sounds [Normal Gait] : normal gait [No Edema] : no edema [No Cyanosis] : no cyanosis [No Clubbing] : no clubbing [No Varicosities] : no varicosities [No Rash] : no rash [No Skin Lesions] : no skin lesions [Moves all extremities] : moves all extremities [No Focal Deficits] : no focal deficits [Normal Speech] : normal speech [Alert and Oriented] : alert and oriented [Normal memory] : normal memory [de-identified] : tachycardia

## 2022-10-21 NOTE — HISTORY OF PRESENT ILLNESS
[FreeTextEntry1] : 77 year old man with a history of a secundum ASD status post closure with a bovine pericardial patch 3/17/16, coronary artery disease status post LIMA to LAD and SVG to diagonal artery, resection of MARIEL, and left CryoMaze ablation 3/17/16 whose surgery was complicated by a cerebrovascular accident with residual hemiparesis, paroxysmal atrial fibrillation, atrial flutter status cardioversion 5/3/2016, HTN, and dyslipidemia.\par \par He now returns from [facility] with no complaints.  He denies palpitations.  He has no complaints and has been living his life without limitations.

## 2022-10-25 ENCOUNTER — OUTPATIENT (OUTPATIENT)
Dept: OUTPATIENT SERVICES | Facility: HOSPITAL | Age: 77
LOS: 1 days | End: 2022-10-25
Payer: MEDICARE

## 2022-10-25 DIAGNOSIS — Z11.52 ENCOUNTER FOR SCREENING FOR COVID-19: ICD-10-CM

## 2022-10-25 LAB — SARS-COV-2 RNA SPEC QL NAA+PROBE: SIGNIFICANT CHANGE UP

## 2022-10-25 PROCEDURE — U0003: CPT

## 2022-10-25 PROCEDURE — U0005: CPT

## 2022-10-25 PROCEDURE — C9803: CPT

## 2022-10-26 ENCOUNTER — NON-APPOINTMENT (OUTPATIENT)
Age: 77
End: 2022-10-26

## 2022-10-28 ENCOUNTER — NON-APPOINTMENT (OUTPATIENT)
Age: 77
End: 2022-10-28

## 2022-10-28 ENCOUNTER — OUTPATIENT (OUTPATIENT)
Dept: OUTPATIENT SERVICES | Facility: HOSPITAL | Age: 77
LOS: 1 days | End: 2022-10-28
Payer: MEDICARE

## 2022-10-28 ENCOUNTER — APPOINTMENT (OUTPATIENT)
Dept: CV DIAGNOSITCS | Facility: HOSPITAL | Age: 77
End: 2022-10-28

## 2022-10-28 VITALS
RESPIRATION RATE: 14 BRPM | HEART RATE: 110 BPM | OXYGEN SATURATION: 97 % | DIASTOLIC BLOOD PRESSURE: 80 MMHG | SYSTOLIC BLOOD PRESSURE: 140 MMHG | TEMPERATURE: 98 F

## 2022-10-28 VITALS
DIASTOLIC BLOOD PRESSURE: 59 MMHG | OXYGEN SATURATION: 99 % | RESPIRATION RATE: 16 BRPM | HEART RATE: 71 BPM | SYSTOLIC BLOOD PRESSURE: 104 MMHG

## 2022-10-28 DIAGNOSIS — I48.4 ATYPICAL ATRIAL FLUTTER: ICD-10-CM

## 2022-10-28 LAB
ANION GAP SERPL CALC-SCNC: 11 MMOL/L — SIGNIFICANT CHANGE UP (ref 5–17)
APTT BLD: 37.3 SEC — HIGH (ref 27.5–35.5)
BUN SERPL-MCNC: 25 MG/DL — HIGH (ref 7–23)
CALCIUM SERPL-MCNC: 9.2 MG/DL — SIGNIFICANT CHANGE UP (ref 8.4–10.5)
CHLORIDE SERPL-SCNC: 103 MMOL/L — SIGNIFICANT CHANGE UP (ref 96–108)
CO2 SERPL-SCNC: 26 MMOL/L — SIGNIFICANT CHANGE UP (ref 22–31)
CREAT SERPL-MCNC: 1.17 MG/DL — SIGNIFICANT CHANGE UP (ref 0.5–1.3)
EGFR: 63 ML/MIN/1.73M2 — SIGNIFICANT CHANGE UP
GLUCOSE SERPL-MCNC: 117 MG/DL — HIGH (ref 70–99)
HCT VFR BLD CALC: 44.3 % — SIGNIFICANT CHANGE UP (ref 39–50)
HGB BLD-MCNC: 15.1 G/DL — SIGNIFICANT CHANGE UP (ref 13–17)
INR BLD: 2.02 RATIO — HIGH (ref 0.88–1.16)
MCHC RBC-ENTMCNC: 32 PG — SIGNIFICANT CHANGE UP (ref 27–34)
MCHC RBC-ENTMCNC: 34.1 GM/DL — SIGNIFICANT CHANGE UP (ref 32–36)
MCV RBC AUTO: 93.9 FL — SIGNIFICANT CHANGE UP (ref 80–100)
NRBC # BLD: 0 /100 WBCS — SIGNIFICANT CHANGE UP (ref 0–0)
PLATELET # BLD AUTO: 261 K/UL — SIGNIFICANT CHANGE UP (ref 150–400)
POTASSIUM SERPL-MCNC: 4.1 MMOL/L — SIGNIFICANT CHANGE UP (ref 3.5–5.3)
POTASSIUM SERPL-SCNC: 4.1 MMOL/L — SIGNIFICANT CHANGE UP (ref 3.5–5.3)
PROTHROM AB SERPL-ACNC: 23.4 SEC — HIGH (ref 10.5–13.4)
RBC # BLD: 4.72 M/UL — SIGNIFICANT CHANGE UP (ref 4.2–5.8)
RBC # FLD: 13.3 % — SIGNIFICANT CHANGE UP (ref 10.3–14.5)
SODIUM SERPL-SCNC: 140 MMOL/L — SIGNIFICANT CHANGE UP (ref 135–145)
WBC # BLD: 7.7 K/UL — SIGNIFICANT CHANGE UP (ref 3.8–10.5)
WBC # FLD AUTO: 7.7 K/UL — SIGNIFICANT CHANGE UP (ref 3.8–10.5)

## 2022-10-28 PROCEDURE — 93325 DOPPLER ECHO COLOR FLOW MAPG: CPT

## 2022-10-28 PROCEDURE — 93312 ECHO TRANSESOPHAGEAL: CPT | Mod: 26

## 2022-10-28 PROCEDURE — 80048 BASIC METABOLIC PNL TOTAL CA: CPT

## 2022-10-28 PROCEDURE — 36415 COLL VENOUS BLD VENIPUNCTURE: CPT

## 2022-10-28 PROCEDURE — 85027 COMPLETE CBC AUTOMATED: CPT

## 2022-10-28 PROCEDURE — 92960 CARDIOVERSION ELECTRIC EXT: CPT

## 2022-10-28 PROCEDURE — C8925: CPT

## 2022-10-28 PROCEDURE — 85610 PROTHROMBIN TIME: CPT

## 2022-10-28 PROCEDURE — 93320 DOPPLER ECHO COMPLETE: CPT | Mod: 26

## 2022-10-28 PROCEDURE — 93325 DOPPLER ECHO COLOR FLOW MAPG: CPT | Mod: 26

## 2022-10-28 PROCEDURE — 93010 ELECTROCARDIOGRAM REPORT: CPT

## 2022-10-28 PROCEDURE — 85730 THROMBOPLASTIN TIME PARTIAL: CPT

## 2022-10-28 PROCEDURE — 93320 DOPPLER ECHO COMPLETE: CPT

## 2022-10-28 PROCEDURE — 93005 ELECTROCARDIOGRAM TRACING: CPT

## 2022-10-28 NOTE — PRE-ANESTHESIA EVALUATION ADULT - NSANTHOSAYNRD_GEN_A_CORE
No. SAMEERA screening performed.  STOP BANG Legend: 0-2 = LOW Risk; 3-4 = INTERMEDIATE Risk; 5-8 = HIGH Risk

## 2022-10-28 NOTE — ASU PATIENT PROFILE, ADULT - ACCEPTABLE
Rm Duarte   6/11/2018 9:15 AM   Anticoagulation Therapy Visit    Description:  73 year old male   Provider:   [facility]   Department:  [facility]agulation           INR as of 6/11/2018     Today's INR 2.2      Anticoagulation Summary as of 6/11/2018     INR goal 2.0-3.0   Today's INR 2.2   Full warfarin instructions 5 mg on Mon, Wed, Fri; 2.5 mg all other days   Next INR check 6/18/2018    Indications   Chronic atrial fibrillation (H) [I48.2]  PVD (peripheral vascular disease) (H) [I73.9]  Long-term (current) use of anticoagulants [Z79.01] [Z79.01]         Your next Anticoagulation Clinic appointment(s)     Jun 18, 2018  9:00 AM CDT   Anticoagulation Visit with  [facility]   [facility] ([facility] [place] )    [address]  [place] [address]   [phone]              June 2018 Details    Sun Mon Tue Wed Thu Fri Sat          1               2                 3               4               5               6               7               8               9                 10               11      5 mg   See details      12      2.5 mg         13      5 mg         14      2.5 mg         15      5 mg         16      2.5 mg           17      2.5 mg         18            19               20               21               22               23                 24               25               26               27               28               29               30                Date Details   06/11 This INR check       Date of next INR:  6/18/2018         How to take your warfarin dose     To take:  2.5 mg Take 0.5 of a 5 mg tablet.    To take:  5 mg Take 1 of the 5 mg tablets.           
2

## 2022-11-01 ENCOUNTER — APPOINTMENT (OUTPATIENT)
Dept: CARDIOLOGY | Facility: CLINIC | Age: 77
End: 2022-11-01
Payer: MEDICARE

## 2022-11-01 ENCOUNTER — NON-APPOINTMENT (OUTPATIENT)
Age: 77
End: 2022-11-01

## 2022-11-01 VITALS
SYSTOLIC BLOOD PRESSURE: 131 MMHG | OXYGEN SATURATION: 97 % | WEIGHT: 190 LBS | BODY MASS INDEX: 28.06 KG/M2 | RESPIRATION RATE: 12 BRPM | DIASTOLIC BLOOD PRESSURE: 80 MMHG | HEART RATE: 71 BPM

## 2022-11-01 LAB
25(OH)D3 SERPL-MCNC: 38.2 NG/ML
ALBUMIN SERPL ELPH-MCNC: 4.4 G/DL
ALP BLD-CCNC: 88 U/L
ALT SERPL-CCNC: 26 U/L
ANION GAP SERPL CALC-SCNC: 14 MMOL/L
AST SERPL-CCNC: 28 U/L
BASOPHILS # BLD AUTO: 0.03 K/UL
BASOPHILS NFR BLD AUTO: 0.4 %
BILIRUB SERPL-MCNC: 1.1 MG/DL
BUN SERPL-MCNC: 25 MG/DL
CALCIUM SERPL-MCNC: 9.7 MG/DL
CHLORIDE SERPL-SCNC: 99 MMOL/L
CHOLEST SERPL-MCNC: 177 MG/DL
CO2 SERPL-SCNC: 25 MMOL/L
CREAT SERPL-MCNC: 1.29 MG/DL
EGFR: 56 ML/MIN/1.73M2
EOSINOPHIL # BLD AUTO: 0.15 K/UL
EOSINOPHIL NFR BLD AUTO: 1.8 %
ESTIMATED AVERAGE GLUCOSE: 108 MG/DL
GLUCOSE SERPL-MCNC: 80 MG/DL
HBA1C MFR BLD HPLC: 5.4 %
HCT VFR BLD CALC: 50.7 %
HDLC SERPL-MCNC: 36 MG/DL
HGB BLD-MCNC: 16.9 G/DL
IMM GRANULOCYTES NFR BLD AUTO: 0.4 %
LDLC SERPL CALC-MCNC: 116 MG/DL
LYMPHOCYTES # BLD AUTO: 2.69 K/UL
LYMPHOCYTES NFR BLD AUTO: 31.6 %
MAN DIFF?: NORMAL
MCHC RBC-ENTMCNC: 32.4 PG
MCHC RBC-ENTMCNC: 33.3 GM/DL
MCV RBC AUTO: 97.3 FL
MONOCYTES # BLD AUTO: 0.64 K/UL
MONOCYTES NFR BLD AUTO: 7.5 %
NEUTROPHILS # BLD AUTO: 4.98 K/UL
NEUTROPHILS NFR BLD AUTO: 58.3 %
NONHDLC SERPL-MCNC: 141 MG/DL
PLATELET # BLD AUTO: 268 K/UL
POTASSIUM SERPL-SCNC: 4.2 MMOL/L
PROT SERPL-MCNC: 8 G/DL
RBC # BLD: 5.21 M/UL
RBC # FLD: 13.5 %
SODIUM SERPL-SCNC: 138 MMOL/L
TRIGL SERPL-MCNC: 122 MG/DL
TSH SERPL-ACNC: 2.21 UIU/ML
WBC # FLD AUTO: 8.52 K/UL

## 2022-11-01 PROCEDURE — 93000 ELECTROCARDIOGRAM COMPLETE: CPT

## 2022-11-01 PROCEDURE — 99214 OFFICE O/P EST MOD 30 MIN: CPT | Mod: 25

## 2022-11-01 RX ORDER — DOCUSATE SODIUM 100 MG/1
100 CAPSULE ORAL
Refills: 0 | Status: DISCONTINUED | COMMUNITY
End: 2022-11-01

## 2022-11-01 RX ORDER — FLUTICASONE PROPIONATE 50 MCG
50 SPRAY, SUSPENSION NASAL
Refills: 0 | Status: DISCONTINUED | COMMUNITY
End: 2022-11-01

## 2022-11-17 ENCOUNTER — NON-APPOINTMENT (OUTPATIENT)
Age: 77
End: 2022-11-17

## 2022-11-17 ENCOUNTER — APPOINTMENT (OUTPATIENT)
Dept: ELECTROPHYSIOLOGY | Facility: CLINIC | Age: 77
End: 2022-11-17

## 2022-11-17 VITALS
HEART RATE: 84 BPM | TEMPERATURE: 97.6 F | WEIGHT: 190 LBS | BODY MASS INDEX: 28.06 KG/M2 | DIASTOLIC BLOOD PRESSURE: 68 MMHG | RESPIRATION RATE: 14 BRPM | SYSTOLIC BLOOD PRESSURE: 132 MMHG | OXYGEN SATURATION: 96 %

## 2022-11-17 PROCEDURE — 93000 ELECTROCARDIOGRAM COMPLETE: CPT

## 2022-11-17 PROCEDURE — 99214 OFFICE O/P EST MOD 30 MIN: CPT | Mod: 25

## 2022-11-17 RX ORDER — DRONEDARONE 400 MG/1
400 TABLET, FILM COATED ORAL
Qty: 180 | Refills: 0 | Status: DISCONTINUED | COMMUNITY
Start: 2018-05-05 | End: 2022-11-17

## 2022-11-18 NOTE — DISCUSSION/SUMMARY
[FreeTextEntry1] : 77 year old man with history of atrial arrhythmias in the setting of CAD and ASD repair.  He demonstrated post CABG arrhythmia and was transiently on amiodarone.  He has recently undergone JULIANE guided cardioversion for atrial flutter today despite antiarrhythmic therapy with Multaq. In flutter his LV function appeared moderately reduced.  Of note, despite increasing his AN dread blockers he did present with 2:1 flutter for his JULIANE. While he has been good about checking his pulse and screening for arrhythmia I am concerned about recurrence rate and I suggested that we proceed with ablation.  We discussed the potential circuits and that it could be better evaluated with EP testing.  He understands that he will remain on AC regardless.  \par \par I communicated this with his wife on the phone they will contact me if they want to proceed

## 2022-11-18 NOTE — PHYSICAL EXAM
[Well Developed] : well developed [Well Nourished] : well nourished [No Acute Distress] : no acute distress [Normal Conjunctiva] : normal conjunctiva [No Carotid Bruit] : no carotid bruit [Normal S1, S2] : normal S1, S2 [No Murmur] : no murmur [No Rub] : no rub [No Gallop] : no gallop [Clear Lung Fields] : clear lung fields [Good Air Entry] : good air entry [No Respiratory Distress] : no respiratory distress  [Soft] : abdomen soft [Non Tender] : non-tender [No Masses/organomegaly] : no masses/organomegaly [Normal Bowel Sounds] : normal bowel sounds [Normal Gait] : normal gait [No Edema] : no edema [No Cyanosis] : no cyanosis [No Clubbing] : no clubbing [No Varicosities] : no varicosities [No Rash] : no rash [No Skin Lesions] : no skin lesions [Moves all extremities] : moves all extremities [No Focal Deficits] : no focal deficits [Normal Speech] : normal speech [Alert and Oriented] : alert and oriented [Normal memory] : normal memory [de-identified] : tachycardia

## 2022-11-18 NOTE — HISTORY OF PRESENT ILLNESS
[FreeTextEntry1] : 77 year old man with a history of a secundum ASD status post closure with a bovine pericardial patch 3/17/16, coronary artery disease status post LIMA to LAD and SVG to diagonal artery, resection of MARIEL, and left CryoMaze ablation 3/17/16 whose surgery was complicated by a cerebrovascular accident with residual hemiparesis, paroxysmal atrial fibrillation, atrial flutter status cardioversion 5/3/2016, HTN, and dyslipidemia.  He has recently undergone JULIANE guided cardioversion for atrial flutter today despite antiarrhythmic therapy with Multaq. In flutter his LV function appeared moderately reduced.  \par \par He has been checking his pulse daily and comments that it is routinely in the 70s.  He feels better since the cardioversion.  No lightheadedness/dizziness.  NO palpitations.

## 2022-11-18 NOTE — CARDIOLOGY SUMMARY
[de-identified] : Today:SR with IVCD [de-identified] : 10/28/2022\par 1. Tethered mitral valve leaflets with normal opening.\par Moderate mitral regurgitation. 2 jets seen centrally.\par 2. Mild atheroma noted in aortic arch/descending aorta.\par 3. Severe left atrial enlargement. S/p surgical closure,\par Small remnant of left atrial appendage. No thrombus seen in\par left atrium or left atrial appendage. Appendage visualized\par with Definity.\par 4. Endocardium not well visualized; grossly moderately\par reduced  left ventricular systolic function. Pre\par cardioversion ; No TTE\par 5. Moderate right atrial enlargement.\par 6. Right ventricular enlargement with decreased right\par ventricular systolic function.\par 7. Normal tricuspid valve. Mild-moderate tricuspid\par regurgitation.\par \par TTE 1/8/2021\par MAC with mild MR\par Moderate LAE\par Mlid concentric LVH\par Endocardium not well visualized; grossly low normal LV EF with inferior hypokinesis.  Paradoxical septal motion c/w prior OHS.\par RVE with decreased RV function.\par Mild to moderate TR. \par No flow across the ASD closure device.

## 2022-12-02 ENCOUNTER — RX RENEWAL (OUTPATIENT)
Age: 77
End: 2022-12-02

## 2022-12-02 RX ORDER — RIVAROXABAN 20 MG/1
20 TABLET, FILM COATED ORAL 3 TIMES DAILY
Qty: 90 | Refills: 0 | Status: ACTIVE | COMMUNITY
Start: 2022-12-02 | End: 1900-01-01

## 2023-01-16 NOTE — PHYSICAL EXAM
[General Appearance - Well Developed] : well developed [Normal Appearance] : normal appearance [Well Groomed] : well groomed [General Appearance - Well Nourished] : well nourished [No Deformities] : no deformities [General Appearance - In No Acute Distress] : no acute distress [Normal Conjunctiva] : the conjunctiva exhibited no abnormalities [Normal Jugular Venous A Waves Present] : normal jugular venous A waves present [Normal Jugular Venous V Waves Present] : normal jugular venous V waves present [Respiration, Rhythm And Depth] : normal respiratory rhythm and effort [Exaggerated Use Of Accessory Muscles For Inspiration] : no accessory muscle use [Auscultation Breath Sounds / Voice Sounds] : lungs were clear to auscultation bilaterally [Normal Rate] : normal [Rhythm Regular] : regular [Normal S1] : normal S1 [Normal S2] : normal S2 [S3] : no S3 [II] : a grade 2 [I] : a grade 1 [No Pitting Edema] : no pitting edema present [Bowel Sounds] : normal bowel sounds [Abdomen Tenderness] : non-tender [Abdomen Soft] : soft [Nail Clubbing] : no clubbing of the fingernails [Cyanosis, Localized] : no localized cyanosis [Petechial Hemorrhages (___cm)] : no petechial hemorrhages [Skin Color & Pigmentation] : normal skin color and pigmentation [] : no rash [No Skin Ulcers] : no skin ulcer [Oriented To Time, Place, And Person] : oriented to person, place, and time [Affect] : the affect was normal [Mood] : the mood was normal [No Anxiety] : not feeling anxious [Tachycardia] : tachycardic [Regularly Irregular] : regularly irregular [Right Carotid Bruit] : no bruit heard over the right carotid [Left Carotid Bruit] : no bruit heard over the left carotid [Bruit] : no bruit heard [FreeTextEntry1] : He has weakness of his left lower extremity resulting in a slow gait.

## 2023-01-16 NOTE — CARDIOLOGY SUMMARY
[LVEF ___%] : LVEF [unfilled]% [None] : no pulmonary hypertension [Mild] : mild mitral regurgitation [___] : [unfilled] [___] : [unfilled] [de-identified] : 11/1/2022: Sinus Rhythm at 71 bpm with a nonspecific IVCD, left anterior fascicular block, poor R wave progression, cannot rule out an inferior infarct, and T wave abnormality, consider lateral ischemia. [de-identified] : JULIANE performed on 10/28/2022: Tethered mitral valve leaflets with normal opening. Moderate mitral regurgitation. Mild atheroma noted in aortic arch/descending aorta. Severe left atrial enlargement. S/p surgical closure, Small remnant of left atrial appendage. No thrombus seen in left atrium or left atrial appendage. Appendage visualized with Definity. Endocardium not well visualized; grossly\par moderately reduced  left ventricular systolic function. Moderate right atrial enlargement. Right ventricular enlargement with decreased right ventricular systolic function. Mild-moderate tricuspid regurgitation. No evidence of a patent foramen ovale.\par \par \par 5/19/2022: Technically difficult study.\par Mild mitral regurgitation. Mild aortic stenosis with an ANÍBAL= 2 sqcm. Mild aortic regurgitation. Severely dilated left atrium. There is no obvious flow seen across the atrial septal closure device.  Mild concentric left ventricular hypertrophy. Endocardium not well visualized; grossly mild segmental left ventricular systolic dysfunction with hypokinesis of the inferior wall. There is paradoxical septal motion, likely secondary to prior cardiac surgery.  Moderate diastolic dysfunction. Moderate right atrial enlargement. Right ventricular enlargement with decreased right ventricular systolic function. No pulmonary HTN with PASP= 31 mm Hg. Mild tricuspid regurgitation. [de-identified] : 10/28/2022: Cardioversion

## 2023-01-16 NOTE — CARDIOLOGY SUMMARY
[LVEF ___%] : LVEF [unfilled]% [None] : no pulmonary hypertension [Mild] : mild mitral regurgitation [___] : [unfilled] [de-identified] : 10/20/2022: Atrial flutter at 113 bpm with a nonspecific IVCD, left anterior fascicular block, old anterolateral infarct, and old inferior infarct.\par  [de-identified] : Pharmacologic Nuclear Stress Test performed on 5/25/2022: ECG could not be interpreted given left bundle branch block. EF= 58%, revealing overall preserved left ventricular ejection fraction with hypokinesis of the basal to mid inferolateral and basal to mid inferior walls. There is paradoxical septal motion, consistent with prior cardiac surgery. There are medium sized, moderate to severe defects of the basal to mid inferior and basal to mid inferolateral walls that are predominantly fixed, suggestive of infarct with mild eduardo-infarct ischemia of the mid inferior and mid inferolateral walls.   [de-identified] : 5/19/2022: Technically difficult study.\par Mild mitral regurgitation. Mild aortic stenosis with an ANÍBAL= 2 sqcm. Mild aortic regurgitation. Severely dilated left atrium. There is no obvious flow seen across the atrial septal closure device.  Mild concentric left ventricular hypertrophy. Endocardium not well visualized; grossly mild segmental left ventricular systolic dysfunction with hypokinesis of the inferior wall. There is paradoxical septal motion, likely secondary to prior cardiac surgery.  Moderate diastolic dysfunction. Moderate right atrial enlargement. Right ventricular enlargement with decreased right ventricular systolic function. No pulmonary HTN with PASP= 31 mm Hg. Mild tricuspid regurgitation.

## 2023-01-16 NOTE — HISTORY OF PRESENT ILLNESS
[FreeTextEntry1] : Patient is a 77 year old man with a history of a secundum ASD status post closure with a bovine pericardial patch 3/17/16, coronary artery disease status post LIMA to LAD and SVG to diagonal artery, resection of MARIEL, and left CryoMaze ablation 3/17/16 whose surgery was complicated by a cerebrovascular accident with residual hemiparesis, paroxysmal atrial fibrillation, atrial flutter status cardioversion 5/3/2016, HTN, and dyslipidemia who presents today for follow up of atrial fibrillation and coronary artery disease. He states that he has been feeling well, denying any chest pain, dyspnea, palpitations, headaches, and dizziness.

## 2023-01-16 NOTE — HISTORY OF PRESENT ILLNESS
[FreeTextEntry1] : Patient is a 77 year old man with a history of a secundum ASD status post closure with a bovine pericardial patch 3/17/16, coronary artery disease status post LIMA to LAD and SVG to diagonal artery, resection of MARIEL, and left CryoMaze ablation 3/17/16 whose surgery was complicated by a cerebrovascular accident with residual hemiparesis, paroxysmal atrial fibrillation, atrial flutter status cardioversion 5/3/2016, HTN, and dyslipidemia who presents today for follow up of atrial flutter and coronary artery disease, and s/p cardioversion on Friday 10/28. Since that time, he has been feeling well, he denies any chest pain, shortness of breath, palpitations, headaches or dizziness. He is unsure of the medications that he is taking.

## 2023-01-16 NOTE — DISCUSSION/SUMMARY
[FreeTextEntry1] : IMPRESSION: Mr. Wakefield is a 77 year old man with a history of a secundum ASD status post closure with a bovine pericardial patch 3/17/2016, coronary artery disease status post 2-vessel CABG with LIMA to the LAD and SVG to the diagonal, resection of the left atrial appendage, and left CryoMaze ablation all on 3/17/2016 whose procedures were complicated by a cerebrovascular accident with residual weakness, paroxysmal atrial fibrillation, atrial flutter status post cardioversion 5/3/2016, HTN, and dyslipidemia who presents today for follow up of atrial fibrillation and coronary artery disease.\par \par PLAN:\par 1. He is currently in rapid atrial flutter on his ECG. We have increased his Propranolol to 20 mg three times a day. He saw EP today and his Multaq was stopped. He will also continue on Xarelto 20mg daily for systemic anticoagulation. I will be checking a CBC and CMP today. He again has a nonspecific IVCD on his ECG. \par 2. He will continue on ASA 81 mg daily given his CAD status post CABG. He will continue on Lipitor 20 mg daily and I will be checking a CMP and lipid profile today. His LDL goal is less than 70. His most recent LDL was above goal. If it remains elevated, then will need to increase the dose of his Atorvastatin further. \par 3. He is being scheduled for a JULIANE/ DCCV by EP to try to restore sinus rhythm. \par 4. His blood pressure was good when it was repeated at the time of the physical examination, thus he will continue on Lasix 20 mg daily and Propranolol.  \par 5. He will follow up with me in about 2 weeks to follow up his atrial flutter. \par 6. He understands that he requires antibiotic prophylaxis for dental procedures.

## 2023-01-16 NOTE — DISCUSSION/SUMMARY
[FreeTextEntry1] : IMPRESSION: Mr. Wakefield is a 77 year old man with a history of a secundum ASD status post closure with a bovine pericardial patch 3/17/2016, coronary artery disease status post 2-vessel CABG with LIMA to the LAD and SVG to the diagonal, resection of the left atrial appendage, and left CryoMaze ablation all on 3/17/2016 whose procedures were complicated by a cerebrovascular accident with residual weakness, paroxysmal atrial fibrillation, atrial flutter status post cardioversion 5/3/2016, HTN, and dyslipidemia who presents today for follow up of coronary artery disease and atrial flutter following recent cardioversion on 10/28..\par \par PLAN:\par 1. He is currently in sinus rhythm on his ECG, thus he will continue on Propranolol 20 mg three times a day. He will also continue on Xarelto 20mg daily for systemic anticoagulation. He again has a nonspecific IVCD on his ECG. \par 2. He will continue on ASA 81 mg daily given his CAD status post CABG. He will continue on Lipitor 20 mg daily. His LDL goal is less than 70. His most recent LDL was above goal, however, improved. If it remains elevated, then will need to increase the dose of his Atorvastatin. \par 3. His blood pressure is good, thus he will continue on Lasix 20 mg daily and Propranolol.  \par 4. He will follow up with me in 3 months or sooner should he experience any symptoms in the interim.\par 5. He understands that he requires antibiotic prophylaxis for dental procedures.  [EKG obtained to assist in diagnosis and management of assessed problem(s)] : EKG obtained to assist in diagnosis and management of assessed problem(s)

## 2023-01-24 ENCOUNTER — NON-APPOINTMENT (OUTPATIENT)
Age: 78
End: 2023-01-24

## 2023-01-24 ENCOUNTER — APPOINTMENT (OUTPATIENT)
Dept: ELECTROPHYSIOLOGY | Facility: CLINIC | Age: 78
End: 2023-01-24
Payer: MEDICARE

## 2023-01-24 VITALS
HEART RATE: 108 BPM | HEIGHT: 69 IN | BODY MASS INDEX: 28.14 KG/M2 | SYSTOLIC BLOOD PRESSURE: 131 MMHG | WEIGHT: 190 LBS | OXYGEN SATURATION: 99 % | DIASTOLIC BLOOD PRESSURE: 90 MMHG

## 2023-01-24 PROCEDURE — 93000 ELECTROCARDIOGRAM COMPLETE: CPT

## 2023-01-24 PROCEDURE — 99214 OFFICE O/P EST MOD 30 MIN: CPT

## 2023-01-24 RX ORDER — PROPRANOLOL HYDROCHLORIDE 20 MG/1
20 TABLET ORAL 3 TIMES DAILY
Refills: 0 | Status: ACTIVE | COMMUNITY
Start: 2023-01-24

## 2023-01-24 NOTE — REVIEW OF SYSTEMS
[Negative] : Heme/Lymph [Feeling Fatigued] : feeling fatigued [SOB] : shortness of breath [Dyspnea on exertion] : dyspnea during exertion [Palpitations] : palpitations

## 2023-01-25 ENCOUNTER — OUTPATIENT (OUTPATIENT)
Dept: OUTPATIENT SERVICES | Facility: HOSPITAL | Age: 78
LOS: 1 days | End: 2023-01-25
Payer: MEDICARE

## 2023-01-25 DIAGNOSIS — Z11.52 ENCOUNTER FOR SCREENING FOR COVID-19: ICD-10-CM

## 2023-01-25 LAB — SARS-COV-2 RNA SPEC QL NAA+PROBE: SIGNIFICANT CHANGE UP

## 2023-01-25 PROCEDURE — U0003: CPT

## 2023-01-25 PROCEDURE — C9803: CPT

## 2023-01-25 PROCEDURE — U0005: CPT

## 2023-01-25 NOTE — PHYSICAL EXAM
[Well Developed] : well developed [Well Nourished] : well nourished [No Acute Distress] : no acute distress [Normal Conjunctiva] : normal conjunctiva [No Carotid Bruit] : no carotid bruit [Normal S1, S2] : normal S1, S2 [No Murmur] : no murmur [No Rub] : no rub [No Gallop] : no gallop [Clear Lung Fields] : clear lung fields [Good Air Entry] : good air entry [No Respiratory Distress] : no respiratory distress  [Soft] : abdomen soft [Non Tender] : non-tender [No Masses/organomegaly] : no masses/organomegaly [Normal Bowel Sounds] : normal bowel sounds [Normal Gait] : normal gait [No Edema] : no edema [No Cyanosis] : no cyanosis [No Clubbing] : no clubbing [No Varicosities] : no varicosities [No Rash] : no rash [No Skin Lesions] : no skin lesions [Moves all extremities] : moves all extremities [No Focal Deficits] : no focal deficits [Normal Speech] : normal speech [Alert and Oriented] : alert and oriented [Normal memory] : normal memory [de-identified] : tachycardia

## 2023-01-25 NOTE — HISTORY OF PRESENT ILLNESS
[FreeTextEntry1] : 77 year old man with a history of a secundum ASD status post closure with a bovine pericardial patch 3/17/16, coronary artery disease status post LIMA to LAD and SVG to diagonal artery, resection of MARIEL, and left CryoMaze ablation 3/17/16 whose surgery was complicated by a cerebrovascular accident with residual hemiparesis, paroxysmal atrial fibrillation, atrial flutter status cardioversion 5/3/2016, HTN, and dyslipidemia.  He has recently undergone JULIANE guided cardioversion for atrial flutter despite antiarrhythmic therapy with Multaq. In flutter his LV function appeared moderately reduced.  \par \par He presents today with recent onset of dyspnea with minimal exertion and elevated HR at home. At rest his HR is 110bpm, with exertion it goes up to 150bpm.

## 2023-01-25 NOTE — DISCUSSION/SUMMARY
[FreeTextEntry1] : 77 year old man with history of atrial arrhythmias in the setting of CAD and ASD repair.  He demonstrated post CABG arrhythmia and was transiently on amiodarone.  He has undergone JULIANE guided cardioversion for atrial flutter despite antiarrhythmic therapy with Multaq. In flutter his LV function appeared moderately reduced. He presents today in recurrent atrial tachycardia with symptoms of dyspnea and fatigue. We will plan for repeat DCCV and then proceed with ablation. He should continue on Xarelto.

## 2023-01-25 NOTE — CARDIOLOGY SUMMARY
[de-identified] : Atrial tachycardia, ventricular rate 109bpm [de-identified] : 10/28/2022\par 1. Tethered mitral valve leaflets with normal opening.\par Moderate mitral regurgitation. 2 jets seen centrally.\par 2. Mild atheroma noted in aortic arch/descending aorta.\par 3. Severe left atrial enlargement. S/p surgical closure,\par Small remnant of left atrial appendage. No thrombus seen in\par left atrium or left atrial appendage. Appendage visualized\par with Definity.\par 4. Endocardium not well visualized; grossly moderately\par reduced  left ventricular systolic function. Pre\par cardioversion ; No TTE\par 5. Moderate right atrial enlargement.\par 6. Right ventricular enlargement with decreased right\par ventricular systolic function.\par 7. Normal tricuspid valve. Mild-moderate tricuspid\par regurgitation.\par \par TTE 1/8/2021\par MAC with mild MR\par Moderate LAE\par Mlid concentric LVH\par Endocardium not well visualized; grossly low normal LV EF with inferior hypokinesis.  Paradoxical septal motion c/w prior OHS.\par RVE with decreased RV function.\par Mild to moderate TR. \par No flow across the ASD closure device.

## 2023-01-26 ENCOUNTER — NON-APPOINTMENT (OUTPATIENT)
Age: 78
End: 2023-01-26

## 2023-01-27 ENCOUNTER — OUTPATIENT (OUTPATIENT)
Dept: OUTPATIENT SERVICES | Facility: HOSPITAL | Age: 78
LOS: 1 days | End: 2023-01-27
Payer: MEDICARE

## 2023-01-27 ENCOUNTER — TRANSCRIPTION ENCOUNTER (OUTPATIENT)
Age: 78
End: 2023-01-27

## 2023-01-27 VITALS
SYSTOLIC BLOOD PRESSURE: 116 MMHG | DIASTOLIC BLOOD PRESSURE: 72 MMHG | HEART RATE: 73 BPM | OXYGEN SATURATION: 98 % | RESPIRATION RATE: 16 BRPM

## 2023-01-27 VITALS
DIASTOLIC BLOOD PRESSURE: 80 MMHG | HEART RATE: 106 BPM | SYSTOLIC BLOOD PRESSURE: 123 MMHG | RESPIRATION RATE: 16 BRPM | TEMPERATURE: 98 F | OXYGEN SATURATION: 99 %

## 2023-01-27 DIAGNOSIS — Z95.1 PRESENCE OF AORTOCORONARY BYPASS GRAFT: Chronic | ICD-10-CM

## 2023-01-27 DIAGNOSIS — I48.91 UNSPECIFIED ATRIAL FIBRILLATION: ICD-10-CM

## 2023-01-27 DIAGNOSIS — Z87.74 PERSONAL HISTORY OF (CORRECTED) CONGENITAL MALFORMATIONS OF HEART AND CIRCULATORY SYSTEM: Chronic | ICD-10-CM

## 2023-01-27 LAB
ANION GAP SERPL CALC-SCNC: 13 MMOL/L — SIGNIFICANT CHANGE UP (ref 5–17)
APTT BLD: 32.8 SEC — SIGNIFICANT CHANGE UP (ref 27.5–35.5)
BUN SERPL-MCNC: 19 MG/DL — SIGNIFICANT CHANGE UP (ref 7–23)
CALCIUM SERPL-MCNC: 9.1 MG/DL — SIGNIFICANT CHANGE UP (ref 8.4–10.5)
CHLORIDE SERPL-SCNC: 103 MMOL/L — SIGNIFICANT CHANGE UP (ref 96–108)
CO2 SERPL-SCNC: 22 MMOL/L — SIGNIFICANT CHANGE UP (ref 22–31)
CREAT SERPL-MCNC: 1.08 MG/DL — SIGNIFICANT CHANGE UP (ref 0.5–1.3)
EGFR: 70 ML/MIN/1.73M2 — SIGNIFICANT CHANGE UP
GLUCOSE SERPL-MCNC: 115 MG/DL — HIGH (ref 70–99)
HCT VFR BLD CALC: 42.7 % — SIGNIFICANT CHANGE UP (ref 39–50)
HGB BLD-MCNC: 14.3 G/DL — SIGNIFICANT CHANGE UP (ref 13–17)
INR BLD: 1.18 RATIO — HIGH (ref 0.88–1.16)
MCHC RBC-ENTMCNC: 31.2 PG — SIGNIFICANT CHANGE UP (ref 27–34)
MCHC RBC-ENTMCNC: 33.5 GM/DL — SIGNIFICANT CHANGE UP (ref 32–36)
MCV RBC AUTO: 93.2 FL — SIGNIFICANT CHANGE UP (ref 80–100)
NRBC # BLD: 0 /100 WBCS — SIGNIFICANT CHANGE UP (ref 0–0)
PLATELET # BLD AUTO: 207 K/UL — SIGNIFICANT CHANGE UP (ref 150–400)
POTASSIUM SERPL-MCNC: 4.9 MMOL/L — SIGNIFICANT CHANGE UP (ref 3.5–5.3)
POTASSIUM SERPL-SCNC: 4.9 MMOL/L — SIGNIFICANT CHANGE UP (ref 3.5–5.3)
PROTHROM AB SERPL-ACNC: 13.7 SEC — HIGH (ref 10.5–13.4)
RBC # BLD: 4.58 M/UL — SIGNIFICANT CHANGE UP (ref 4.2–5.8)
RBC # FLD: 13.6 % — SIGNIFICANT CHANGE UP (ref 10.3–14.5)
SODIUM SERPL-SCNC: 138 MMOL/L — SIGNIFICANT CHANGE UP (ref 135–145)
WBC # BLD: 7.09 K/UL — SIGNIFICANT CHANGE UP (ref 3.8–10.5)
WBC # FLD AUTO: 7.09 K/UL — SIGNIFICANT CHANGE UP (ref 3.8–10.5)

## 2023-01-27 PROCEDURE — 93306 TTE W/DOPPLER COMPLETE: CPT | Mod: 26

## 2023-01-27 PROCEDURE — 93005 ELECTROCARDIOGRAM TRACING: CPT

## 2023-01-27 PROCEDURE — 85610 PROTHROMBIN TIME: CPT

## 2023-01-27 PROCEDURE — 92960 CARDIOVERSION ELECTRIC EXT: CPT

## 2023-01-27 PROCEDURE — 93010 ELECTROCARDIOGRAM REPORT: CPT

## 2023-01-27 PROCEDURE — 93312 ECHO TRANSESOPHAGEAL: CPT | Mod: 26

## 2023-01-27 PROCEDURE — C8929: CPT

## 2023-01-27 PROCEDURE — 80048 BASIC METABOLIC PNL TOTAL CA: CPT

## 2023-01-27 PROCEDURE — 93010 ELECTROCARDIOGRAM REPORT: CPT | Mod: 77

## 2023-01-27 PROCEDURE — 85027 COMPLETE CBC AUTOMATED: CPT

## 2023-01-27 PROCEDURE — C8925: CPT

## 2023-01-27 PROCEDURE — 85730 THROMBOPLASTIN TIME PARTIAL: CPT

## 2023-01-27 NOTE — ASU DISCHARGE PLAN (ADULT/PEDIATRIC) - NS MD DC FALL RISK RISK
For information on Fall & Injury Prevention, visit: [email or web address] OR  [email or web address] OR  https://www.cdc.gov/steadi/patient.html

## 2023-01-27 NOTE — H&P CARDIOLOGY - NSICDXPASTMEDICALHX_GEN_ALL_CORE_FT
PAST MEDICAL HISTORY:  ASD (atrial septal defect)     Atrial fibrillation     Atrial flutter     CAD (coronary artery disease)     CVA (cerebrovascular accident)     HLD (hyperlipidemia)     HTN (hypertension)

## 2023-01-27 NOTE — ASU PATIENT PROFILE, ADULT - FALL HARM RISK - UNIVERSAL INTERVENTIONS
Bed in lowest position, wheels locked, appropriate side rails in place/Call bell, personal items and telephone in reach/Instruct patient to call for assistance before getting out of bed or chair/Non-slip footwear when patient is out of bed/[place] to call system/Physically safe environment - no spills, clutter or unnecessary equipment/Purposeful Proactive Rounding/Room/bathroom lighting operational, light cord in reach

## 2023-01-27 NOTE — ASU DISCHARGE PLAN (ADULT/PEDIATRIC) - CARE PROVIDER_API CALL
Collin Hanna)  Cardiovascular Disease; Nuclear Cardiology  [address] [address] [place] [address], [address]  [place] [address]  Phone: [phone]  Fax: [phone]  Established Patient  Follow Up Time: Routine

## 2023-01-27 NOTE — H&P CARDIOLOGY - NSICDXPASTSURGICALHX_GEN_ALL_CORE_FT
PAST SURGICAL HISTORY:  H/O congenital atrial septal defect (ASD) repair     S/P CABG (coronary artery bypass graft)

## 2023-01-27 NOTE — H&P CARDIOLOGY - HISTORY OF PRESENT ILLNESS
77 year old man with a history of a secundum ASD status post closure with a bovine pericardial patch 3/17/16, coronary artery disease status post LIMA to LAD and SVG to diagonal artery, resection of MARIEL, and left CryoMaze ablation 3/17/16 whose surgery was complicated by a cerebrovascular accident with residual hemiparesis, paroxysmal atrial fibrillation, atrial flutter status cardioversion 5/3/2016, HTN, and dyslipidemia. He has recently undergone JULIANE guided cardioversion for atrial flutter despite antiarrhythmic therapy with Multaq. In flutter his LV function appeared moderately reduced.      Cardiology Summary  ECG: Atrial tachycardia, ventricular rate 109bpm   Echo: 10/28/2022  1. Tethered mitral valve leaflets with normal opening. Moderate mitral regurgitation. 2 jets seen centrally.  2. Mild atheroma noted in aortic arch/descending aorta.  3. Severe left atrial enlargement. S/p surgical closure, Small remnant of left atrial appendage. No thrombus seen in left atrium or left atrial appendage. Appendage visualized with Definity.  4. Endocardium not well visualized; grossly moderately reduced left ventricular systolic function. Precardioversion ; No TTE  5. Moderate right atrial enlargement.  6. Right ventricular enlargement with decreased right ventricular systolic function.  7. Normal tricuspid valve. Mild-moderate tricuspid regurgitation.    TTE 1/8/2021  MAC with mild MR  Moderate LAE  Mlid concentric LVH  Endocardium not well visualized; grossly low normal LV EF with inferior hypokinesis. Paradoxical septal motion c/w prior OHS.  RVE with decreased RV function.  Mild to moderate TR.   No flow across the ASD closure device.     Pt reports SOB with exercise. Here today for DCCV. On xarelto (last dose last night, uninterrupted). Denies CP, SOB, palpitations, N/V, fever/chills, abd pain, numbness/tingling/weakness, other c/o at this time.

## 2023-01-27 NOTE — ASU PREOP CHECKLIST - ALLERGIES REVIEWED
CONSULTING PHYSICIAN:  William Tate MD

DATE OF CONSULTATION:  08/31/2019

 

HISTORY OF PRESENT ILLNESS:

This is a 25-year-old who was brought from [place] about an hour and hour and

half ago with an accidentally inflicted knife wound to the palm of his hand.  It

was bleeding.  He said they held pressure on it from [place] and when he tried

in the emergency room, it continued to be bleeding.  Nurses put direct pressure

on it and controlled the bleeding.  I was asked to consult concerning an

arterial bleeder in the wound.  The patient's history is somewhat confusing, but

says he was washing dishes and knife entered in accidentally.  His tetanus is

current.  No major medical problems he states.  The patient has been given some

medication of fentanyl.

 

PHYSICAL EXAMINATION:

GENERAL:  Shows a slightly groggy male.

LUNGS:  Clear.

HEART:  Tones regular.

VITAL SIGNS:  Stable.

EXTREMITIES:  The hand was examined by first putting pressure on the ulnar and

radial nerve, which controlled the bleeding.  It was prepped with Betadine and

using some 1% Xylocaine without epinephrine infiltrated in the wound.  Doppler

was then performed sequentially taking pressure of ulnar and radial artery

demonstrating an intact arch.  Fingers did show some numbness in the index and

middle finger, not so much to thumb or the little finger.  There was reluctance

to move his hand, his fingers in flexion and extension.  The wound itself

measured about a centimeter and was cleaned.  Dressing was re-applied and

controlled the bleeding.  I did discuss this case with Dr. Moncada, plastic

surgeon.  They did not recommend any immediate surgical intervention here and

suggested transfer.

 

ASSESSMENT:

Injury to hand with damage to the arch and possible median nerve injury,

possible tendon injury.

 

DD:  08/31/2019 07:31:16

DT:  08/31/2019 09:52:25  MMMARIKA

Job #:  [id number] done

## 2023-01-30 PROBLEM — I63.9 CEREBRAL INFARCTION, UNSPECIFIED: Chronic | Status: ACTIVE | Noted: 2023-01-27

## 2023-01-30 PROBLEM — E78.5 HYPERLIPIDEMIA, UNSPECIFIED: Chronic | Status: ACTIVE | Noted: 2023-01-27

## 2023-01-30 PROBLEM — I25.10 ATHEROSCLEROTIC HEART DISEASE OF NATIVE CORONARY ARTERY WITHOUT ANGINA PECTORIS: Chronic | Status: ACTIVE | Noted: 2023-01-27

## 2023-01-30 PROBLEM — I48.92 UNSPECIFIED ATRIAL FLUTTER: Chronic | Status: ACTIVE | Noted: 2023-01-27

## 2023-01-30 PROBLEM — Q21.10 ATRIAL SEPTAL DEFECT, UNSPECIFIED: Chronic | Status: ACTIVE | Noted: 2023-01-27

## 2023-02-08 ENCOUNTER — OUTPATIENT (OUTPATIENT)
Dept: OUTPATIENT SERVICES | Facility: HOSPITAL | Age: 78
LOS: 1 days | End: 2023-02-08
Payer: MEDICARE

## 2023-02-08 VITALS
WEIGHT: 188.94 LBS | HEIGHT: 67.72 IN | DIASTOLIC BLOOD PRESSURE: 80 MMHG | HEART RATE: 84 BPM | SYSTOLIC BLOOD PRESSURE: 134 MMHG | RESPIRATION RATE: 17 BRPM | OXYGEN SATURATION: 97 % | TEMPERATURE: 98 F

## 2023-02-08 DIAGNOSIS — Z95.1 PRESENCE OF AORTOCORONARY BYPASS GRAFT: Chronic | ICD-10-CM

## 2023-02-08 DIAGNOSIS — Z87.74 PERSONAL HISTORY OF (CORRECTED) CONGENITAL MALFORMATIONS OF HEART AND CIRCULATORY SYSTEM: Chronic | ICD-10-CM

## 2023-02-08 DIAGNOSIS — Z01.818 ENCOUNTER FOR OTHER PREPROCEDURAL EXAMINATION: ICD-10-CM

## 2023-02-08 DIAGNOSIS — I48.92 UNSPECIFIED ATRIAL FLUTTER: ICD-10-CM

## 2023-02-08 DIAGNOSIS — I48.4 ATYPICAL ATRIAL FLUTTER: ICD-10-CM

## 2023-02-08 PROCEDURE — 86901 BLOOD TYPING SEROLOGIC RH(D): CPT

## 2023-02-08 PROCEDURE — G0463: CPT

## 2023-02-08 PROCEDURE — 85027 COMPLETE CBC AUTOMATED: CPT

## 2023-02-08 PROCEDURE — 86850 RBC ANTIBODY SCREEN: CPT

## 2023-02-08 PROCEDURE — 86900 BLOOD TYPING SEROLOGIC ABO: CPT

## 2023-02-08 PROCEDURE — 80048 BASIC METABOLIC PNL TOTAL CA: CPT

## 2023-02-08 NOTE — H&P PST ADULT - HISTORY OF PRESENT ILLNESS
77 yr old male with hx of CAD ,ASD  repair  Atrial Fib/Flutter s/p cardioversion.  Referred for ablation. To stay on Xarelto per cardiology    *COVID  denies foreign travel  denies current s/s  denies known exposure  COVID 12/2022  mild s/s home quarantine follow up test  negative   swab 2/19 Katie

## 2023-02-08 NOTE — H&P PST ADULT - NSICDXPASTMEDICALHX_GEN_ALL_CORE_FT
PAST MEDICAL HISTORY:  2019 novel coronavirus disease (COVID-19)     ASD (atrial septal defect)     Atrial fibrillation     Atrial flutter     CAD (coronary artery disease)     CVA (cerebrovascular accident)     HLD (hyperlipidemia)     HTN (hypertension)

## 2023-02-08 NOTE — H&P PST ADULT - NSICDXPROCEDURE_GEN_ALL_CORE_FT
PROCEDURES:  Electrophysiology study with ablation of arrhythmogenic focus for atrial flutter 08-Feb-2023 08:46:04  Gilma Garcia

## 2023-02-08 NOTE — H&P PST ADULT - ASSESSMENT
DASI: walk s 5 city [place]  Symptoms : Denies SOB, BANKS, palpitations  Airway : no airway abnormalities , denies prior anesthesia complications   Mallampati : 3  Dentures upper    Jdue abrasion risk : Denies

## 2023-02-13 ENCOUNTER — NON-APPOINTMENT (OUTPATIENT)
Age: 78
End: 2023-02-13

## 2023-02-19 ENCOUNTER — OUTPATIENT (OUTPATIENT)
Dept: OUTPATIENT SERVICES | Facility: HOSPITAL | Age: 78
LOS: 1 days | End: 2023-02-19

## 2023-02-19 DIAGNOSIS — Z87.74 PERSONAL HISTORY OF (CORRECTED) CONGENITAL MALFORMATIONS OF HEART AND CIRCULATORY SYSTEM: Chronic | ICD-10-CM

## 2023-02-19 DIAGNOSIS — Z11.52 ENCOUNTER FOR SCREENING FOR COVID-19: ICD-10-CM

## 2023-02-19 DIAGNOSIS — Z95.1 PRESENCE OF AORTOCORONARY BYPASS GRAFT: Chronic | ICD-10-CM

## 2023-02-19 LAB — SARS-COV-2 RNA SPEC QL NAA+PROBE: SIGNIFICANT CHANGE UP

## 2023-02-22 ENCOUNTER — INPATIENT (INPATIENT)
Facility: HOSPITAL | Age: 78
LOS: 0 days | Discharge: HOME | DRG: 274 | End: 2023-02-23
Attending: INTERNAL MEDICINE | Admitting: INTERNAL MEDICINE
Payer: MEDICARE

## 2023-02-22 ENCOUNTER — TRANSCRIPTION ENCOUNTER (OUTPATIENT)
Age: 78
End: 2023-02-22

## 2023-02-22 VITALS
OXYGEN SATURATION: 100 % | HEART RATE: 74 BPM | DIASTOLIC BLOOD PRESSURE: 74 MMHG | SYSTOLIC BLOOD PRESSURE: 159 MMHG | RESPIRATION RATE: 16 BRPM | TEMPERATURE: 98 F

## 2023-02-22 DIAGNOSIS — I48.4 ATYPICAL ATRIAL FLUTTER: ICD-10-CM

## 2023-02-22 DIAGNOSIS — Z95.1 PRESENCE OF AORTOCORONARY BYPASS GRAFT: Chronic | ICD-10-CM

## 2023-02-22 DIAGNOSIS — Z87.74 PERSONAL HISTORY OF (CORRECTED) CONGENITAL MALFORMATIONS OF HEART AND CIRCULATORY SYSTEM: Chronic | ICD-10-CM

## 2023-02-22 PROCEDURE — 93655 ICAR CATH ABLTJ DSCRT ARRHYT: CPT

## 2023-02-22 PROCEDURE — 93010 ELECTROCARDIOGRAM REPORT: CPT

## 2023-02-22 PROCEDURE — 93653 COMPRE EP EVAL TX SVT: CPT

## 2023-02-22 PROCEDURE — 93010 ELECTROCARDIOGRAM REPORT: CPT | Mod: 77

## 2023-02-22 PROCEDURE — 93662 INTRACARDIAC ECG (ICE): CPT | Mod: 26

## 2023-02-22 PROCEDURE — 93623 PRGRMD STIMJ&PACG IV RX NFS: CPT | Mod: 26

## 2023-02-22 RX ORDER — POTASSIUM CHLORIDE 20 MEQ
10 PACKET (EA) ORAL DAILY
Refills: 0 | Status: DISCONTINUED | OUTPATIENT
Start: 2023-02-22 | End: 2023-02-23

## 2023-02-22 RX ORDER — ASPIRIN/CALCIUM CARB/MAGNESIUM 324 MG
1 TABLET ORAL
Qty: 0 | Refills: 0 | DISCHARGE

## 2023-02-22 RX ORDER — RIVAROXABAN 15 MG-20MG
20 KIT ORAL DAILY
Refills: 0 | Status: DISCONTINUED | OUTPATIENT
Start: 2023-02-22 | End: 2023-02-23

## 2023-02-22 RX ORDER — PROPRANOLOL HCL 160 MG
1 CAPSULE, EXTENDED RELEASE 24HR ORAL
Qty: 0 | Refills: 0 | DISCHARGE

## 2023-02-22 RX ORDER — ATORVASTATIN CALCIUM 80 MG/1
1 TABLET, FILM COATED ORAL
Qty: 0 | Refills: 0 | DISCHARGE

## 2023-02-22 RX ORDER — FAMOTIDINE 10 MG/ML
20 INJECTION INTRAVENOUS DAILY
Refills: 0 | Status: DISCONTINUED | OUTPATIENT
Start: 2023-02-22 | End: 2023-02-23

## 2023-02-22 RX ORDER — FUROSEMIDE 40 MG
20 TABLET ORAL DAILY
Refills: 0 | Status: DISCONTINUED | OUTPATIENT
Start: 2023-02-22 | End: 2023-02-23

## 2023-02-22 RX ORDER — RIVAROXABAN 15 MG-20MG
20 KIT ORAL ONCE
Refills: 0 | Status: COMPLETED | OUTPATIENT
Start: 2023-02-22 | End: 2023-02-22

## 2023-02-22 RX ORDER — POTASSIUM CHLORIDE 20 MEQ
1 PACKET (EA) ORAL
Qty: 0 | Refills: 0 | DISCHARGE

## 2023-02-22 RX ORDER — CHOLECALCIFEROL (VITAMIN D3) 125 MCG
1 CAPSULE ORAL
Qty: 0 | Refills: 0 | DISCHARGE

## 2023-02-22 RX ORDER — ASPIRIN/CALCIUM CARB/MAGNESIUM 324 MG
81 TABLET ORAL DAILY
Refills: 0 | Status: DISCONTINUED | OUTPATIENT
Start: 2023-02-22 | End: 2023-02-23

## 2023-02-22 RX ORDER — ATORVASTATIN CALCIUM 80 MG/1
20 TABLET, FILM COATED ORAL AT BEDTIME
Refills: 0 | Status: DISCONTINUED | OUTPATIENT
Start: 2023-02-22 | End: 2023-02-23

## 2023-02-22 RX ORDER — ONDANSETRON 8 MG/1
4 TABLET, FILM COATED ORAL ONCE
Refills: 0 | Status: COMPLETED | OUTPATIENT
Start: 2023-02-22 | End: 2023-02-22

## 2023-02-22 RX ADMIN — ATORVASTATIN CALCIUM 20 MILLIGRAM(S): 80 TABLET, FILM COATED ORAL at 22:18

## 2023-02-22 RX ADMIN — RIVAROXABAN 20 MILLIGRAM(S): KIT at 17:21

## 2023-02-22 RX ADMIN — ONDANSETRON 4 MILLIGRAM(S): 8 TABLET, FILM COATED ORAL at 14:08

## 2023-02-22 NOTE — ASU DISCHARGE PLAN (ADULT/PEDIATRIC) - ASU DC SPECIAL INSTRUCTIONSFT
WOUND CARE:  The day AFTER your procedure  - Remove the bandage at the site GENTLY, clean with mild soap and water, and pat dry; leave open to air  - You may shower   - DO NOT apply lotions, creams, ointments, powder, perfumes to your incision site  - Check your groin every day. A small amount of bruising or soreness is normal, a bump (smaller than nickel) might be present which is normal  - DO NOT SOAK your site for 1 week (no baths, no pools, no tubs, etc..)    ACTIVITY:  Your procedure was done through your groin  For the next 7 DAYS:  - Limit climbing stairs, no strenuous activity, pushing , pulling, or straining (especially during bowel movements)  - DO NOT LIFT anything 10 lbs or heavier   - You may resume sexual activity in 7 days, unless instructed otherwise    Mild palpitations are normal     Follow heart healthy diet recommended by your doctor, , if you smoke STOP SMOKING (may call [phone] for center of tobacco control if you need assistance).  For the next 24 hours:   - Stay at home and rest, do not drive or operate heavy machinery   Do not drink alcoholic beverages   Do not make important personal or business decisions     ***CALL YOUR DOCTOR ***  IF you have fever, chills, body aches, or severe pain, swelling, redness, heat, yellow drainage from your incision site  IF bleeding or significant new swelling from your puncture site  IF you experience rapid heartbeat or palpitations that cause: lightheadedness, dizziness, or fainting spell.  IF you experience difficulty swallowing, or pain with swallowing   IF unable to get in contact with your doctor, you may call the Cardiology Office at [facility] at [phone]

## 2023-02-22 NOTE — ASU DISCHARGE PLAN (ADULT/PEDIATRIC) - CARE PROVIDER_API CALL
Toni Chinchilla)  Cardiac Electrophysiology; Cardiology  [address] [place] [address]  Phone: [phone]  Fax: [phone]  Follow Up Time:

## 2023-02-22 NOTE — PATIENT PROFILE ADULT - FALL HARM RISK - FALL HARM RISK
PRE-OP HISTORY & PHYSICAL    PRIMARY CARE PHYSICIAN:  Aren Atkinson MD  REQUESTING PHYSICIAN:  Dr. Kyle    CHIEF COMPLAINT:  Pre-Op Exam      HISTORY OF PRESENT ILLNESS:  Peterson Segovia is a 10 year old male who presented for pre-op medical clearance.  Peterson incurred a right patella dislocation on 2020.  He was planting his foot and rotated on his foot and the dislocation occurred.  There was immediate pain and swelling.  He continues have limping and swelling while wearing a brace.  He is now scheduled for right knee arthroscopy with medial patellofemoral ligament reconstruction with a chondral defect and debridement.    Procedure: Right knee arthroscopy with medial patellofemoral ligament reconstruction, with chondral defect and debridement.   Facility: [facility]   Date: 2020     ACTIVE PROBLEMS:  Patient Active Problem List   Diagnosis   • Underweight   • Routine infant or child health check   • Allergic rhinitis, seasonal   • [place] screening tests negative       PAST MEDICAL HISTORY:  History reviewed.  Patient  has no past medical history on file.    PAST SURGICAL HISTORY:  History reviewed.  Patient  has no past surgical history on file.    FAMILY HISTORY:  History reviewed.  No family history on file.    SOCIAL HISTORY:  Tobacco Use:  has no history on file for tobacco.  Alcohol Use:  has no history on file for alcohol.  Drug Use:  has no history on file for drug.    IMMUNIZATIONS: up to date  Immunization History   Administered Date(s) Administered   • DTaP (INFANRIX)(DAPTACEL,INFANRIX) 2009   • DTaP/Hep B/IPV 2007, 2008, 2008   • DTaP/IPV 10/06/2012   • HIB, Unspecified Formulation 2008, 2008, 2009, 10/06/2010   • Hep B, Unspecified Formulation 2007   • Hepatitis A - Adult 10/06/2008, 10/26/2009   • Influenza, Unspecified Formulation 10/13/2010, 10/15/2011, 10/06/2012, 2013, 10/18/2014, 10/31/2015,  10/30/2017   • Influenza, injectable, quadrivalent 01/12/2019   • MMR 01/23/2009   • Measles Mumps Rubella Varicella 04/01/2013   • Meningococcal Conjugate MCV4O (Menveo) 04/29/2019   • Pneumococcal Conjugate 7 Valent 2007, 02/02/2008, 04/05/2008, 10/06/2008   • Rotavirus - pentavalent 2007, 02/02/2008, 04/05/2008   • Tdap 04/29/2019   • Varicella 01/23/2009       ALLERGIES:  ALLERGIES: no known allergies.    MEDICATIONS:  No current outpatient medications on file.     No current facility-administered medications for this visit.        SURGICAL/ANESTHESIA HISTORY:  []  YES    [x]  NO     []  UNKNOWN  History of previous surgery   []  YES    [x]  NO     []  UNKNOWN   History of problematic/difficult intubations.  []  YES    [x]  NO     []  UNKNOWN   History of prior anesthesia reactions.  []  YES    [x]  NO     []  UNKNOWN   Family history of anesthesia reactions.  []  YES    [x]  NO     []  UNKNOWN   History of bleeding or clotting disorders.  []  YES    [x]  NO     []  UNKNOWN   Family history of bleeding/clotting disorders.  []  YES    [x]  NO     []  UNKNOWN   Past history of blood transfusions.     Information related to the above positive findings include: none.    REVIEW OF SYSTEMS:  Constitutional:  No fevers.  Normal intake of fluids and solids.  Skin:  No rash or lesion.  HEENT:  No eye drainage, ear pain, nasal congestion or sore throat.   Respiratory:  No cough, wheezing or breathing difficulty.   Gastrointestinal:  No vomiting or diarrhea.    Genitourinary: No frequency, dysuria or hematuria.   Musculoskeletal: No decrease in range of motion, pain with movement, joint swelling or extremity guarding.     PHYSICAL EXAM:  VITAL SIGNS:   Visit Vitals  /62   Pulse 98   Temp 97.8 °F (36.6 °C) (Temporal)   Ht 4' 8.89\" (1.445 m)   Wt (!) 29 kg (64 lb)   SpO2 99%   BMI 13.90 kg/m²    1 %ile (Z= -2.23) based on CDC (Boys, 2-20 Years) weight-for-age data using vitals from 2/17/2020.  GENERAL:   Well appearing male child.  Alert and active.  SKIN:  Warm, normal turgor. No cyanosis. No rash.  HEAD:  Normocephalic, atraumatic.    EYES:  Conjunctivae appear normal, noninjected, nonicteric, positive red reflex.  NOSE:  Appears normal without drainage.  EARS:  Normal external auditory canals. Tympanic membranes are transparent with normal landmarks.  THROAT:  Oropharynx with moist mucous membranes without lesions.  NECK:  Supple, no lymphadenopathy or masses.  HEART:  Regular rate and rhythm.  Normal S1, S2.  No murmurs, rubs, gallops.   LUNGS:  Clear to auscultation. No wheezes, rales, rhonchi.  Normal work effort with breathing.  ABDOMEN:  Bowel sounds present. Soft, nontender. No hepatomegaly, splenomegaly or masses.  GENITOURINARY:  Leo stage I  EXTREMITIES: Normal left knee.  Right knee in a Velcro brace swelling and effusion noted compared to the left.  Sensation intact distally.  BACK: Spine straight. No costovertebral angle tenderness.      NEUROLOGIC:  Oriented x4. No gross lateralizing signs or focal deficits.  Normal gait.      ASSESSMENT:    This is a 10 year old male who is medically stable with no contraindications to the planned surgery .      Patellar dislocation  Chondral defect    Note- currently has lingering nasal congestion consistent with a possible occult sinusitis.  Started on amoxicillin will reassess 48 hours prior to surgery to see if there is resolution of his symptoms.  No pulmonary component at this time.      PLAN:    Right knee arthroscopy with medial patellofemoral ligament reconstruction with chondral defect and debridement  A copy of this note will be sent to dr miah Atkinson MD  .       No indicators present

## 2023-02-22 NOTE — PATIENT PROFILE ADULT - DO YOU LACK THE NECESSARY SUPPORT TO HELP YOU COPE WITH LIFE CHALLENGES?
I do not believe his presentation in terms of symptoms may have been secondary to taking inhaler therapy.   I have not seen that as a side effect and also looked up adverse effects with Symbicort and Spiriva and presentation he describes is not listed as pa no

## 2023-02-22 NOTE — H&P CARDIOLOGY - DATE:
22-Feb-2023 [FreeTextEntry1] : CERVICAL\par GEN: AAOx3. NAD.\par CERVICAL ROM: Flexion, extension, side-bending, rotation, oblique extension all full/pain free.  \par SHOULDER ROM: Full and pain free B/L.\par PALP: (+) TTP B-Traps/LevScap/Paraspinals. No TTP midline or shoulder region B/L.\par INSP: Spine alignment is midline, No evidence of scoliosis.\par STRENGTH: 5/5 bilateral shoulder abductors, elbow flexors, elbow extensors, wrist extensors, hand intrinsics, long finger flexors to D3 and D5.\par TONE: Normal, No clonus.\par REFLEXES: Symmetric biceps, triceps, brachioradialis, pronator teres. Trejo's (-) B/L.\par SENSATION: Grossly intact LT BUE.\par GAIT: Non antalgic, Normal reciprocating heel to toe.\par SPECIAL: Spurling's (-) B/L. Axial Compression (-). \par \par LUMBAR\par LS ROM: Flexion, extension, side-bending, rotation, oblique extension all full/pain free.  \par HIP ROM: Full and pain B/L.\par PALP: No TTP midline spinous processes, paravertebral muscles, SIJ, or greater trochanters B/L.\par INSP: Spine alignment is midline, with no evidence of scoliosis.\par STRENGTH: 5/5 bilateral hip flexors, knee extensors, ankle dorsiflexors, long toe extensors, ankle plantar flexors, hip abductors.\par SENS: Grossly intact to LT BLE.\par REFLEXES: Symmetric patella, achilles. \par TONE: Normal, No clonus.\par STANCE: No Trendelenburg with single leg stance.\par GAIT: Non antalgic, normal reciprocating heel to toe\par SPECIAL: SLR and Slump (-) bilaterally. FADIR/CLARK (-) B/L.\par

## 2023-02-22 NOTE — PATIENT PROFILE ADULT - NSPROMEDSADMININFO_GEN_A_NUR
aspirin 81 mg oral tablet: 1 tab(s) orally once a day  carbidopa-levodopa 25 mg-100 mg oral tablet: 1.5 tab(s) orally once a day (at bedtime)  carbidopa-levodopa 25 mg-100 mg oral tablet: 1 tab(s) orally 3 times a day  DULoxetine 30 mg oral delayed release capsule: 1 cap(s) orally once a day  gabapentin 300 mg oral capsule: 1 cap(s) orally 2 times a day  metoprolol succinate 25 mg oral tablet, extended release: 1 tab(s) orally once a day  predniSONE 10 mg oral tablet: 1 tab(s) orally 2 times a day  traMADol 50 mg oral tablet: 1 tab(s) orally every 8 hours, As needed, Severe Pain (7 - 10) no concerns

## 2023-02-22 NOTE — H&P CARDIOLOGY - HISTORY OF PRESENT ILLNESS
H and P dated 2/8/23  Allergies reviewed  Medications reconciled  PE unchanged.    Pt presents for atypical aflutter ablation. Last dose of Xarelto last night 2/21/23

## 2023-02-23 ENCOUNTER — TRANSCRIPTION ENCOUNTER (OUTPATIENT)
Age: 78
End: 2023-02-23

## 2023-02-23 VITALS
SYSTOLIC BLOOD PRESSURE: 106 MMHG | OXYGEN SATURATION: 96 % | HEART RATE: 77 BPM | TEMPERATURE: 98 F | DIASTOLIC BLOOD PRESSURE: 56 MMHG | RESPIRATION RATE: 17 BRPM

## 2023-02-23 DIAGNOSIS — E78.5 HYPERLIPIDEMIA, UNSPECIFIED: ICD-10-CM

## 2023-02-23 DIAGNOSIS — I48.91 UNSPECIFIED ATRIAL FIBRILLATION: ICD-10-CM

## 2023-02-23 DIAGNOSIS — I10 ESSENTIAL (PRIMARY) HYPERTENSION: ICD-10-CM

## 2023-02-23 PROCEDURE — 93799 UNLISTED CV SVC/PROCEDURE: CPT

## 2023-02-23 PROCEDURE — 93655 ICAR CATH ABLTJ DSCRT ARRHYT: CPT

## 2023-02-23 PROCEDURE — C9803: CPT

## 2023-02-23 PROCEDURE — 93653 COMPRE EP EVAL TX SVT: CPT

## 2023-02-23 PROCEDURE — C1760: CPT

## 2023-02-23 PROCEDURE — 93623 PRGRMD STIMJ&PACG IV RX NFS: CPT

## 2023-02-23 PROCEDURE — 97161 PT EVAL LOW COMPLEX 20 MIN: CPT

## 2023-02-23 PROCEDURE — C1731: CPT

## 2023-02-23 PROCEDURE — 99232 SBSQ HOSP IP/OBS MODERATE 35: CPT

## 2023-02-23 PROCEDURE — C1769: CPT

## 2023-02-23 PROCEDURE — 93656 COMPRE EP EVAL ABLTJ ATR FIB: CPT

## 2023-02-23 PROCEDURE — C1887: CPT

## 2023-02-23 PROCEDURE — U0003: CPT

## 2023-02-23 PROCEDURE — C1894: CPT

## 2023-02-23 PROCEDURE — U0005: CPT

## 2023-02-23 PROCEDURE — 93005 ELECTROCARDIOGRAM TRACING: CPT

## 2023-02-23 PROCEDURE — C1766: CPT

## 2023-02-23 PROCEDURE — 93662 INTRACARDIAC ECG (ICE): CPT

## 2023-02-23 PROCEDURE — C1732: CPT

## 2023-02-23 RX ADMIN — Medication 10 MILLIEQUIVALENT(S): at 11:54

## 2023-02-23 RX ADMIN — Medication 20 MILLIGRAM(S): at 05:11

## 2023-02-23 RX ADMIN — RIVAROXABAN 20 MILLIGRAM(S): KIT at 11:54

## 2023-02-23 RX ADMIN — Medication 81 MILLIGRAM(S): at 05:11

## 2023-02-23 RX ADMIN — FAMOTIDINE 20 MILLIGRAM(S): 10 INJECTION INTRAVENOUS at 11:54

## 2023-02-23 NOTE — PHYSICAL THERAPY INITIAL EVALUATION ADULT - PERTINENT HX OF CURRENT PROBLEM, REHAB EVAL
77 yr old male with hx of CAD ,ASD  repair  Atrial Fib/Flutter s/p cardioversion.  Referred for ablation. To stay on Xarelto per cardiology. pt is now s/p transseptal afib/flutter ablation on 2/22

## 2023-02-23 NOTE — PHYSICAL THERAPY INITIAL EVALUATION ADULT - ADDITIONAL COMMENTS
PTA pt was independent with functional mobility and ADLs. Pt lives in a  with 2 steps to enter +HRs, 1st floor setup inside

## 2023-02-23 NOTE — DISCHARGE NOTE PROVIDER - HOSPITAL COURSE
2/22 s/p transseptal atrial fib/flutter ablation. Right femoral site without swelling, bleeding. 77 year old man with a history of a secundum ASD status post closure with a bovine pericardial patch 3/17/16, coronary artery disease status post LIMA to LAD and SVG to diagonal artery, resection of MARIEL, and left CryoMaze ablation 3/17/16 whose surgery was complicated by a cerebrovascular accident with residual hemiparesis, paroxysmal atrial fibrillation, atrial flutter status cardioversion 5/3/2016, HTN, and dyslipidemia. He has recently undergone JULIANE guided cardioversion for atrial flutter despite antiarrhythmic therapy with Multaq. In flutter his LV function appeared moderately reduced.    2/22 s/p transseptal atrial fib/flutter ablation. Right femoral site without swelling, bleeding.

## 2023-02-23 NOTE — DISCHARGE NOTE PROVIDER - NSDCCPCAREPLAN_GEN_ALL_CORE_FT
PRINCIPAL DISCHARGE DIAGNOSIS  Diagnosis: Atrial fibrillation  Assessment and Plan of Treatment: Continue with your cardiologist and primary care MD. Continue your current medications. Call your physician for palpitations, feelings of rapid heart beat, lightheadedness, or dizziness. Continue Xarelto as prescribed.      SECONDARY DISCHARGE DIAGNOSES  Diagnosis: HTN (hypertension)  Assessment and Plan of Treatment: Continue with your blood pressure medications; eat a heart healthy diet with low salt diet; exercise regularly (consult with your physician or cardiologist first); maintain a heart healthy weight; if you smoke - quit (A resource to help you stop smoking is the [facility] Tobacco Control – phone number [phone].); include healthy ways to manage stress. Continue to follow with your primary care physician or cardiologist.    Diagnosis: HLD (hyperlipidemia)  Assessment and Plan of Treatment: Continue with your cholesterol medications. Eat a heart healthy diet that is low in saturated fats and salt, and includes whole grains, fruits, vegetables and lean protein; exercise regularly (consult with your physician or cardiologist first); maintain a heart healthy weight; if you smoke - quit (A resource to help you stop smoking is the [facility] for Tobacco Control – phone number [phone].). Continue to follow with your primary physician or cardiologist.

## 2023-02-23 NOTE — PROGRESS NOTE ADULT - SUBJECTIVE AND OBJECTIVE BOX
HPI:  H and P dated 2/8/23  Allergies reviewed  Medications reconciled  PE unchanged.    Pt presents for atypical aflutter ablation. Last dose of Xarelto last night 2/21/23 (22 Feb 2023 06:46)    Patient is a 77y old  Male who presents with a chief complaint of atrial flutter (23 Feb 2023 02:40)          Allergies    No Known Allergies    Intolerances        Medications:  aspirin enteric coated 81 milliGRAM(s) Oral daily  atorvastatin 20 milliGRAM(s) Oral at bedtime  famotidine    Tablet 20 milliGRAM(s) Oral daily  furosemide    Tablet 20 milliGRAM(s) Oral daily  potassium chloride    Tablet ER 10 milliEquivalent(s) Oral daily  propranolol 20 milliGRAM(s) Oral three times a day  rivaroxaban 20 milliGRAM(s) Oral daily      Vitals:  T(C): 36.9 (02-22-23 @ 20:10), Max: 36.9 (02-22-23 @ 20:10)  HR: 81 (02-22-23 @ 20:10) (74 - 88)  BP: 114/49 (02-22-23 @ 20:10) (98/41 - 177/78)  BP(mean): 64 (02-22-23 @ 20:10) (64 - 98)  RR: 19 (02-22-23 @ 20:10) (16 - 19)  SpO2: 95% (02-22-23 @ 20:10) (90% - 100%)  Wt(kg): --  Daily     Daily   I&O's Summary    22 Feb 2023 07:01  -  23 Feb 2023 04:17  --------------------------------------------------------  IN: 0 mL / OUT: 650 mL / NET: -650 mL          Physical Exam:  Appearance: Normal  Eyes: PERRL, EOMI  HENT: Normal oral mus[place]a, NC/AT  Cardiovascular: S1S2, RRR, No M/R/G, no JVD, No Lower extremity edema  Procedural Access Site: No hematoma, Non-tender to palpation, 2+ pulse, No bruit, No Ecchymosis  Respiratory: Clear to auscultation bilaterally  Gastrointestinal: Soft, Non tender, Normal Bowel Sounds  Musculoskeletal: No clubbing, No joint deformity   Neurologic: Non-focal  Lymphatic: No lymphadenopathy  Psychiatry: AAOx3, Mood & affect appropriate  Skin: No rashes, No ecchymoses, No cyanosis                  Lipid panel   Hgb A1c         ECG: SR 1st degree 81 bpm

## 2023-02-23 NOTE — DISCHARGE NOTE PROVIDER - CARE PROVIDERS DIRECT ADDRESSES
[email or web address]

## 2023-02-23 NOTE — DISCHARGE NOTE PROVIDER - CARE PROVIDER_API CALL
Toni Chinchilla)  Cardiac Electrophysiology; Cardiology  [address] [place] [address]  Phone: [phone]  Fax: [phone]  Scheduled Appointment: 03/28/2023    Collin Hanna)  Cardiovascular Disease; Nuclear Cardiology  [address] [place] [address], [address]  [place] [address]  Phone: [phone]  Fax: [phone]  Scheduled Appointment: 05/09/2023   Toni Chinchilla)  Cardiac Electrophysiology; Cardiology  [address] [place] [address]  Phone: [phone]  Fax: [phone]  Established Patient  Scheduled Appointment: 03/28/2023 03:30 PM    Collin Hanna)  Cardiovascular Disease; Nuclear Cardiology  [address], [address]  [place] [address]  Phone: [phone]  Fax: [phone]  Scheduled Appointment: 05/09/2023

## 2023-02-23 NOTE — DISCHARGE NOTE PROVIDER - NSDCMRMEDTOKEN_GEN_ALL_CORE_FT
Aspirin Enteric Coated 81 mg oral delayed release tablet: 1 tab(s) orally once a day  atorvastatin 20 mg oral tablet: 1 tab(s) orally once a day  famotidine 20 mg oral tablet: 1 tab(s) orally once a day  furosemide 20 mg oral tablet: 1 tab(s) orally once a day  potassium chloride 10 mEq oral capsule, extended release: 1 cap(s) orally once a day  propranolol 20 mg oral tablet: 1 tab(s) orally 3 times a day  rivaroxaban 20 mg oral tablet: 1 tab(s) orally once a day  Vitamin D3 1250 mcg (50,000 intl units) oral capsule: 1 cap(s) orally once a week   Aspirin Enteric Coated 81 mg oral delayed release tablet: 1 tab(s) orally once a day  atorvastatin 20 mg oral tablet: 1 tab(s) orally once a day  famotidine 20 mg oral tablet: 1 tab(s) orally once a day  furosemide 20 mg oral tablet: 1 tab(s) orally once a day  outpatient physical therap : outpatient PT  Dx unsteady gait  ICD code 26.89  potassium chloride 10 mEq oral capsule, extended release: 1 cap(s) orally once a day  propranolol 20 mg oral tablet: 1 tab(s) orally 3 times a day  rivaroxaban 20 mg oral tablet: 1 tab(s) orally once a day  Vitamin D3 1250 mcg (50,000 intl units) oral capsule: 1 cap(s) orally once a week

## 2023-02-23 NOTE — DISCHARGE NOTE PROVIDER - NSDCCPTREATMENT_GEN_ALL_CORE_FT
PRINCIPAL PROCEDURE  Procedure: Intracardiac catheter ablation for atrial fibrillation  Findings and Treatment: Atrial fib/flutter ablation

## 2023-02-23 NOTE — CHART NOTE - NSCHARTNOTEFT_GEN_A_CORE
CARMELO MUJICA  [id number]    PROCEDURE:  Afib ablation    INDICATION:  Atypical flutter    ELECTROPHYSIOLOGIST(S):  MD Wilder William MD (fellow)    ANESTHESIOLOGY:  GA    FINDINGS:  Inducible atrial flutter 1 CL 255ms with distal to proximal activation which spontaneously changed to atrial flutter 2 (CL 240ms) with proximal to distal activation. Activation mapping showed the entire cycle length within the right atrium for flutter 2. This then spontaneously converted to atrial flutter 3 with distal to proximal activation (CL 305ms). Atrial flutter 3 was successfully entrained from the distal CS with PPI-TCL of 30ms, consistent with left sided flutter  Vein of genevieve ethanol ablation performed with slowing and termination of flutter with the first injection of ethanol  Evidence of prior PVI with RSPV and LIPV isolated from prior procedure  Large area of scar extending from LIPV across posterior wall to RIPV  Full PVI created, automaticity from LPV  Roof line with resultant PWI and automaticity from posterior wall  Lateral mitral isthmus line with bidirectional block confirmed with pacing and activation mapping  No further inducible atrial flutters, afib induced with rapid atrial pacing requiring 360J DCCV    COMPLICATIONS:  None    RECOMMENDATIONS:  Resume xarelto tonight  Resume home meds  Likely discharge home today
Pt. with PMH CAD ,ASD  repair  Atrial Fib/Flutter s/p cardioversion.  Referred for ablation.   Pt. now s/p aflutter ablation without complication via RFV Vascade x 3.      Pt. will require a walker due to unsteady gait ICD code 26.89.

## 2023-02-23 NOTE — DISCHARGE NOTE PROVIDER - PROVIDER TOKENS
PROVIDER:[TOKEN:[[id number]],SCHEDULEDAPPT:[03/28/2023]],PROVIDER:[[id number]],SCHEDULEDAPPT:[05/09/2023]] PROVIDER:[TOKEN:[[id number]],SCHEDULEDAPPT:[03/28/2023],SCHEDULEDAPPTTIME:[03:30 PM],ESTABLISHEDPATIENT:[T]],PROVIDER:[TOKEN:[[id number]],SCHEDULEDAPPT:[05/09/2023]]

## 2023-02-23 NOTE — PHYSICAL THERAPY INITIAL EVALUATION ADULT - PLANNED THERAPY INTERVENTIONS, PT EVAL
GOAL: Pt will negotiate 1 flight +UHR independently in 2 weeks./balance training/bed mobility training/gait training/strengthening/transfer training

## 2023-02-23 NOTE — DISCHARGE NOTE PROVIDER - NSDCFUSCHEDAPPT_GEN_ALL_CORE_FT
Toni Chinchilla  [facility]  ELECTROPH [address]  Scheduled Appointment: 03/28/2023    Collin Hanna  [facility]  CARDIOLOGY [address]  Scheduled Appointment: 05/09/2023    Toni Chinchilla  [facility]  ELECTROPH [address]  Scheduled Appointment: 05/16/2023     Toni Chinchilla  [facility]  ELECTROPH [address]  Scheduled Appointment: 03/28/2023    Collin Hanna  [facility]  CARDIOLOGY [address]  Scheduled Appointment: 05/09/2023

## 2023-02-23 NOTE — DISCHARGE NOTE NURSING/CASE MANAGEMENT/SOCIAL WORK - PATIENT PORTAL LINK FT
You can access the FollowMyHealth Patient Portal offered by [facility] by registering at the following website: [email or web address]. By joining [employer]’s FollowMyHealth portal, you will also be able to view your health information using other applications (apps) compatible with our system.

## 2023-02-23 NOTE — PHYSICAL THERAPY INITIAL EVALUATION ADULT - SIT-TO-STAND BALANCE
Pt informed of below.  She is very grateful for JSG response.  RX to pharmacy via eprescribe per Dr. Fiorella Mauro.  Will keep upcoming appt with ortho.      good minus

## 2023-02-24 ENCOUNTER — RX RENEWAL (OUTPATIENT)
Age: 78
End: 2023-02-24

## 2023-03-26 NOTE — REVIEW OF SYSTEMS
[Feeling Fatigued] : feeling fatigued [SOB] : shortness of breath [Dyspnea on exertion] : dyspnea during exertion [Palpitations] : palpitations [Negative] : Heme/Lymph

## 2023-03-28 ENCOUNTER — NON-APPOINTMENT (OUTPATIENT)
Age: 78
End: 2023-03-28

## 2023-03-28 ENCOUNTER — APPOINTMENT (OUTPATIENT)
Dept: ELECTROPHYSIOLOGY | Facility: CLINIC | Age: 78
End: 2023-03-28
Payer: MEDICARE

## 2023-03-28 VITALS
OXYGEN SATURATION: 99 % | SYSTOLIC BLOOD PRESSURE: 153 MMHG | BODY MASS INDEX: 28.44 KG/M2 | DIASTOLIC BLOOD PRESSURE: 89 MMHG | HEIGHT: 69 IN | HEART RATE: 83 BPM | WEIGHT: 192 LBS

## 2023-03-28 DIAGNOSIS — I48.92 UNSPECIFIED ATRIAL FLUTTER: ICD-10-CM

## 2023-03-28 PROCEDURE — 99213 OFFICE O/P EST LOW 20 MIN: CPT

## 2023-03-28 PROCEDURE — 93000 ELECTROCARDIOGRAM COMPLETE: CPT

## 2023-03-29 NOTE — CARDIOLOGY SUMMARY
[de-identified] : today: Sinus  Rhythm \par -Intraventricular conduction defect and left axis -possible anterior fascicular block   consider ventricular hypertrophy. \par  -Poor R-wave progression -nonspecific -consider old anterior infarct.  [de-identified] : 10/28/2022\par 1. Tethered mitral valve leaflets with normal opening.\par Moderate mitral regurgitation. 2 jets seen centrally.\par 2. Mild atheroma noted in aortic arch/descending aorta.\par 3. Severe left atrial enlargement. S/p surgical closure,\par Small remnant of left atrial appendage. No thrombus seen in\par left atrium or left atrial appendage. Appendage visualized\par with Definity.\par 4. Endocardium not well visualized; grossly moderately\par reduced  left ventricular systolic function. Pre\par cardioversion ; No TTE\par 5. Moderate right atrial enlargement.\par 6. Right ventricular enlargement with decreased right\par ventricular systolic function.\par 7. Normal tricuspid valve. Mild-moderate tricuspid\par regurgitation.\par \par TTE 1/8/2021\par MAC with mild MR\par Moderate LAE\par Mlid concentric LVH\par Endocardium not well visualized; grossly low normal LV EF with inferior hypokinesis.  Paradoxical septal motion c/w prior OHS.\par RVE with decreased RV function.\par Mild to moderate TR. \par No flow across the ASD closure device.  [de-identified] : 2/23/2023\par Flutter 1: CL 255ms with distal to proximal activation \par Flutter 2: CL 240ms with proximal to distal activation consistent with typical flutter\par Flutter 3: CL 305ms with distal to proximal activation, terminated with VoM ethanol infusion\par Successful VoM ethanol infusion \par CTI ablation \par PVI with automaticity from right veins \par PWI with automaticity from posterior wall \par Lateral mitral isthmus line with bidirectional block \par No inducible atrial flutter on isuprel post ablation

## 2023-03-29 NOTE — HISTORY OF PRESENT ILLNESS
[FreeTextEntry1] : 77 year old man with history of atrial arrhythmias in the setting of CAD and ASD repair.  He demonstrated post CABG arrhythmia and was transiently on amiodarone.  He has undergone JULIANE guided cardioversion for atrial flutter despite antiarrhythmic therapy with Multaq. In flutter his LV function appeared moderately reduced. On 2/23/2023 he underwent EP testing with left and right atrial ablation.\par \par He feels well and is without complaints. No chest pain. No shortness of breath. No lightheadedness/dizziness.  The groin is well healed.

## 2023-03-29 NOTE — PHYSICAL EXAM
[Well Developed] : well developed [Well Nourished] : well nourished [No Acute Distress] : no acute distress [Normal Conjunctiva] : normal conjunctiva [No Carotid Bruit] : no carotid bruit [Normal S1, S2] : normal S1, S2 [No Murmur] : no murmur [No Rub] : no rub [No Gallop] : no gallop [Clear Lung Fields] : clear lung fields [No Respiratory Distress] : no respiratory distress  [Good Air Entry] : good air entry [Soft] : abdomen soft [Non Tender] : non-tender [No Masses/organomegaly] : no masses/organomegaly [Normal Bowel Sounds] : normal bowel sounds [Normal Gait] : normal gait [No Edema] : no edema [No Cyanosis] : no cyanosis [No Clubbing] : no clubbing [No Rash] : no rash [No Varicosities] : no varicosities [No Skin Lesions] : no skin lesions [Moves all extremities] : moves all extremities [No Focal Deficits] : no focal deficits [Normal Speech] : normal speech [Alert and Oriented] : alert and oriented [Normal memory] : normal memory [de-identified] : tachycardia

## 2023-03-29 NOTE — DISCUSSION/SUMMARY
[FreeTextEntry1] : 77 year old man with history of atrial arrhythmias in the setting of CAD and ASD repair.  He demonstrated post CABG arrhythmia and was transiently on amiodarone.  He has undergone JULIANE guided cardioversion for atrial flutter despite antiarrhythmic therapy with Multaq. In flutter his LV function appeared moderately reduced. On 2/23/2023 he underwent EP testing with left and right atrial ablation.  Clinically doing well.  I suggested a follow up echo 3 months post procedure.

## 2023-05-09 ENCOUNTER — NON-APPOINTMENT (OUTPATIENT)
Age: 78
End: 2023-05-09

## 2023-05-09 ENCOUNTER — APPOINTMENT (OUTPATIENT)
Dept: CARDIOLOGY | Facility: CLINIC | Age: 78
End: 2023-05-09
Payer: MEDICARE

## 2023-05-09 VITALS
HEART RATE: 87 BPM | SYSTOLIC BLOOD PRESSURE: 144 MMHG | RESPIRATION RATE: 14 BRPM | WEIGHT: 190 LBS | HEIGHT: 69 IN | DIASTOLIC BLOOD PRESSURE: 72 MMHG | BODY MASS INDEX: 28.14 KG/M2 | OXYGEN SATURATION: 97 % | TEMPERATURE: 97.5 F

## 2023-05-09 PROBLEM — U07.1 COVID-19: Chronic | Status: ACTIVE | Noted: 2023-02-08

## 2023-05-09 PROCEDURE — 93000 ELECTROCARDIOGRAM COMPLETE: CPT

## 2023-05-09 PROCEDURE — 99214 OFFICE O/P EST MOD 30 MIN: CPT | Mod: 25

## 2023-05-10 LAB
25(OH)D3 SERPL-MCNC: 38.8 NG/ML
ALBUMIN SERPL ELPH-MCNC: 4.2 G/DL
ALP BLD-CCNC: 101 U/L
ALT SERPL-CCNC: 20 U/L
ANION GAP SERPL CALC-SCNC: 13 MMOL/L
AST SERPL-CCNC: 21 U/L
BASOPHILS # BLD AUTO: 0.02 K/UL
BASOPHILS NFR BLD AUTO: 0.3 %
BILIRUB SERPL-MCNC: 0.7 MG/DL
BUN SERPL-MCNC: 23 MG/DL
CALCIUM SERPL-MCNC: 9.5 MG/DL
CHLORIDE SERPL-SCNC: 104 MMOL/L
CHOLEST SERPL-MCNC: 120 MG/DL
CO2 SERPL-SCNC: 23 MMOL/L
CREAT SERPL-MCNC: 1.01 MG/DL
EGFR: 76 ML/MIN/1.73M2
EOSINOPHIL # BLD AUTO: 0.16 K/UL
EOSINOPHIL NFR BLD AUTO: 2.2 %
ESTIMATED AVERAGE GLUCOSE: 108 MG/DL
GLUCOSE SERPL-MCNC: 114 MG/DL
HBA1C MFR BLD HPLC: 5.4 %
HCT VFR BLD CALC: 44.3 %
HDLC SERPL-MCNC: 36 MG/DL
HGB BLD-MCNC: 14 G/DL
IMM GRANULOCYTES NFR BLD AUTO: 0.3 %
LDLC SERPL CALC-MCNC: 62 MG/DL
LYMPHOCYTES # BLD AUTO: 2.4 K/UL
LYMPHOCYTES NFR BLD AUTO: 32.5 %
MAN DIFF?: NORMAL
MCHC RBC-ENTMCNC: 30.5 PG
MCHC RBC-ENTMCNC: 31.6 GM/DL
MCV RBC AUTO: 96.5 FL
MONOCYTES # BLD AUTO: 0.51 K/UL
MONOCYTES NFR BLD AUTO: 6.9 %
NEUTROPHILS # BLD AUTO: 4.27 K/UL
NEUTROPHILS NFR BLD AUTO: 57.8 %
NONHDLC SERPL-MCNC: 84 MG/DL
NT-PROBNP SERPL-MCNC: 295 PG/ML
PLATELET # BLD AUTO: 213 K/UL
POTASSIUM SERPL-SCNC: 4.1 MMOL/L
PROT SERPL-MCNC: 7.6 G/DL
RBC # BLD: 4.59 M/UL
RBC # FLD: 14 %
SODIUM SERPL-SCNC: 141 MMOL/L
TRIGL SERPL-MCNC: 110 MG/DL
TSH SERPL-ACNC: 1.68 UIU/ML
WBC # FLD AUTO: 7.38 K/UL

## 2023-05-11 LAB — PSA SERPL-MCNC: 0.97 NG/ML

## 2023-05-16 ENCOUNTER — APPOINTMENT (OUTPATIENT)
Dept: ELECTROPHYSIOLOGY | Facility: CLINIC | Age: 78
End: 2023-05-16

## 2023-05-22 ENCOUNTER — APPOINTMENT (OUTPATIENT)
Dept: CARDIOLOGY | Facility: CLINIC | Age: 78
End: 2023-05-22

## 2023-05-31 ENCOUNTER — RX RENEWAL (OUTPATIENT)
Age: 78
End: 2023-05-31

## 2023-05-31 NOTE — PHYSICAL EXAM
[General Appearance - Well Developed] : well developed [General Appearance - Well Nourished] : well nourished [Well Groomed] : well groomed [Normal Appearance] : normal appearance [No Deformities] : no deformities [General Appearance - In No Acute Distress] : no acute distress [Normal Conjunctiva] : the conjunctiva exhibited no abnormalities [No Oral Cyanosis] : no oral cyanosis [Normal Oral Mucosa] : normal oral mucosa [No Oral Pallor] : no oral pallor [No Jugular Venous Jarvis A Waves] : no jugular venous jarvis A waves [Normal Jugular Venous V Waves Present] : normal jugular venous V waves present [Normal Jugular Venous A Waves Present] : normal jugular venous A waves present [Exaggerated Use Of Accessory Muscles For Inspiration] : no accessory muscle use [Respiration, Rhythm And Depth] : normal respiratory rhythm and effort [Auscultation Breath Sounds / Voice Sounds] : lungs were clear to auscultation bilaterally [Bowel Sounds] : normal bowel sounds [Abdomen Soft] : soft [Abnormal Walk] : normal gait [Nail Clubbing] : no clubbing of the fingernails [Abdomen Tenderness] : non-tender [Cyanosis, Localized] : no localized cyanosis [Skin Color & Pigmentation] : normal skin color and pigmentation [Petechial Hemorrhages (___cm)] : no petechial hemorrhages [Oriented To Time, Place, And Person] : oriented to person, place, and time [No Skin Ulcers] : no skin ulcer [] : no rash [No Anxiety] : not feeling anxious [Mood] : the mood was normal [Affect] : the affect was normal [Rhythm Regular] : regular [Normal S1] : normal S1 [Normal Rate] : normal [Normal S2] : normal S2 [I] : a grade 1 [No Pitting Edema] : no pitting edema present [FreeTextEntry1] : He has weakness of his left lower extremity. [S3] : no S3 [Right Carotid Bruit] : no bruit heard over the right carotid [Left Carotid Bruit] : no bruit heard over the left carotid [Bruit] : no bruit heard Birth Control Pills Counseling: Birth Control Pill Counseling: I discussed with the patient the potential side effects of OCPs including but not limited to increased risk of stroke, heart attack, thrombophlebitis, deep venous thrombosis, hepatic adenomas, breast changes, GI upset, headaches, and depression.  The patient verbalized understanding of the proper use and possible adverse effects of OCPs. All of the patient's questions and concerns were addressed.

## 2023-06-01 ENCOUNTER — RX RENEWAL (OUTPATIENT)
Age: 78
End: 2023-06-01

## 2023-07-31 ENCOUNTER — RX RENEWAL (OUTPATIENT)
Age: 78
End: 2023-07-31

## 2023-08-30 ENCOUNTER — RX RENEWAL (OUTPATIENT)
Age: 78
End: 2023-08-30

## 2023-08-31 ENCOUNTER — RX RENEWAL (OUTPATIENT)
Age: 78
End: 2023-08-31

## 2023-09-27 ENCOUNTER — APPOINTMENT (OUTPATIENT)
Dept: CARDIOLOGY | Facility: CLINIC | Age: 78
End: 2023-09-27
Payer: MEDICARE

## 2023-09-27 ENCOUNTER — NON-APPOINTMENT (OUTPATIENT)
Age: 78
End: 2023-09-27

## 2023-09-27 VITALS
BODY MASS INDEX: 27.25 KG/M2 | OXYGEN SATURATION: 95 % | TEMPERATURE: 97.6 F | RESPIRATION RATE: 12 BRPM | DIASTOLIC BLOOD PRESSURE: 81 MMHG | WEIGHT: 184 LBS | HEART RATE: 86 BPM | HEIGHT: 69 IN | SYSTOLIC BLOOD PRESSURE: 159 MMHG

## 2023-09-27 VITALS — DIASTOLIC BLOOD PRESSURE: 70 MMHG | SYSTOLIC BLOOD PRESSURE: 122 MMHG

## 2023-09-27 DIAGNOSIS — I48.91 UNSPECIFIED ATRIAL FIBRILLATION: ICD-10-CM

## 2023-09-27 PROCEDURE — 99214 OFFICE O/P EST MOD 30 MIN: CPT | Mod: 25

## 2023-09-27 PROCEDURE — 93000 ELECTROCARDIOGRAM COMPLETE: CPT

## 2023-10-13 ENCOUNTER — RX RENEWAL (OUTPATIENT)
Age: 78
End: 2023-10-13

## 2023-10-17 ENCOUNTER — RX RENEWAL (OUTPATIENT)
Age: 78
End: 2023-10-17

## 2023-10-17 DIAGNOSIS — I10 ESSENTIAL (PRIMARY) HYPERTENSION: ICD-10-CM

## 2023-10-17 LAB
25(OH)D3 SERPL-MCNC: 28.3 NG/ML
ALBUMIN SERPL ELPH-MCNC: 4.2 G/DL
ALP BLD-CCNC: 80 U/L
ALT SERPL-CCNC: 30 U/L
ANION GAP SERPL CALC-SCNC: 12 MMOL/L
AST SERPL-CCNC: 29 U/L
BASOPHILS # BLD AUTO: 0.03 K/UL
BASOPHILS NFR BLD AUTO: 0.5 %
BILIRUB SERPL-MCNC: 1.4 MG/DL
BUN SERPL-MCNC: 25 MG/DL
CALCIUM SERPL-MCNC: 8.8 MG/DL
CHLORIDE SERPL-SCNC: 103 MMOL/L
CHOLEST SERPL-MCNC: 141 MG/DL
CO2 SERPL-SCNC: 26 MMOL/L
CREAT SERPL-MCNC: 1.05 MG/DL
EGFR: 72 ML/MIN/1.73M2
EOSINOPHIL # BLD AUTO: 0.21 K/UL
EOSINOPHIL NFR BLD AUTO: 3.5 %
ESTIMATED AVERAGE GLUCOSE: 108 MG/DL
GLUCOSE SERPL-MCNC: 105 MG/DL
HBA1C MFR BLD HPLC: 5.4 %
HCT VFR BLD CALC: 45.5 %
HDLC SERPL-MCNC: 35 MG/DL
HGB BLD-MCNC: 15.2 G/DL
IMM GRANULOCYTES NFR BLD AUTO: 0.3 %
LDLC SERPL CALC-MCNC: 89 MG/DL
LYMPHOCYTES # BLD AUTO: 1.2 K/UL
LYMPHOCYTES NFR BLD AUTO: 20 %
MAN DIFF?: NORMAL
MCHC RBC-ENTMCNC: 31.9 PG
MCHC RBC-ENTMCNC: 33.4 GM/DL
MCV RBC AUTO: 95.4 FL
MONOCYTES # BLD AUTO: 0.71 K/UL
MONOCYTES NFR BLD AUTO: 11.9 %
NEUTROPHILS # BLD AUTO: 3.82 K/UL
NEUTROPHILS NFR BLD AUTO: 63.8 %
NONHDLC SERPL-MCNC: 106 MG/DL
PLATELET # BLD AUTO: 186 K/UL
POTASSIUM SERPL-SCNC: 3.6 MMOL/L
PROT SERPL-MCNC: 7.1 G/DL
RBC # BLD: 4.77 M/UL
RBC # FLD: 13.8 %
SODIUM SERPL-SCNC: 141 MMOL/L
TRIGL SERPL-MCNC: 85 MG/DL
TSH SERPL-ACNC: 1.23 UIU/ML
WBC # FLD AUTO: 5.99 K/UL

## 2023-11-07 ENCOUNTER — RX RENEWAL (OUTPATIENT)
Age: 78
End: 2023-11-07

## 2024-03-19 ENCOUNTER — APPOINTMENT (OUTPATIENT)
Dept: CARDIOLOGY | Facility: CLINIC | Age: 79
End: 2024-03-19
Payer: MEDICARE

## 2024-03-19 ENCOUNTER — NON-APPOINTMENT (OUTPATIENT)
Age: 79
End: 2024-03-19

## 2024-03-19 VITALS
RESPIRATION RATE: 12 BRPM | OXYGEN SATURATION: 98 % | SYSTOLIC BLOOD PRESSURE: 158 MMHG | HEIGHT: 69 IN | WEIGHT: 190 LBS | HEART RATE: 87 BPM | BODY MASS INDEX: 28.14 KG/M2 | DIASTOLIC BLOOD PRESSURE: 92 MMHG

## 2024-03-19 VITALS — DIASTOLIC BLOOD PRESSURE: 84 MMHG | SYSTOLIC BLOOD PRESSURE: 136 MMHG

## 2024-03-19 VITALS — SYSTOLIC BLOOD PRESSURE: 152 MMHG | DIASTOLIC BLOOD PRESSURE: 86 MMHG

## 2024-03-19 DIAGNOSIS — I63.9 CEREBRAL INFARCTION, UNSPECIFIED: ICD-10-CM

## 2024-03-19 DIAGNOSIS — I25.10 ATHEROSCLEROTIC HEART DISEASE OF NATIVE CORONARY ARTERY W/OUT ANGINA PECTORIS: ICD-10-CM

## 2024-03-19 DIAGNOSIS — E78.5 HYPERLIPIDEMIA, UNSPECIFIED: ICD-10-CM

## 2024-03-19 PROCEDURE — 93000 ELECTROCARDIOGRAM COMPLETE: CPT

## 2024-03-19 PROCEDURE — 99214 OFFICE O/P EST MOD 30 MIN: CPT

## 2024-03-19 PROCEDURE — G2211 COMPLEX E/M VISIT ADD ON: CPT

## 2024-03-24 LAB
25(OH)D3 SERPL-MCNC: 35.2 NG/ML
ALBUMIN SERPL ELPH-MCNC: 4.4 G/DL
ALP BLD-CCNC: 82 U/L
ALT SERPL-CCNC: 15 U/L
ANION GAP SERPL CALC-SCNC: 12 MMOL/L
AST SERPL-CCNC: 17 U/L
BILIRUB SERPL-MCNC: 0.8 MG/DL
BUN SERPL-MCNC: 18 MG/DL
CALCIUM SERPL-MCNC: 9.1 MG/DL
CHLORIDE SERPL-SCNC: 105 MMOL/L
CHOLEST SERPL-MCNC: 160 MG/DL
CO2 SERPL-SCNC: 25 MMOL/L
CREAT SERPL-MCNC: 1.04 MG/DL
EGFR: 73 ML/MIN/1.73M2
ESTIMATED AVERAGE GLUCOSE: 103 MG/DL
FOLATE SERPL-MCNC: 9.6 NG/ML
GLUCOSE SERPL-MCNC: 125 MG/DL
HBA1C MFR BLD HPLC: 5.2 %
HDLC SERPL-MCNC: 35 MG/DL
LDLC SERPL CALC-MCNC: 103 MG/DL
NONHDLC SERPL-MCNC: 125 MG/DL
POTASSIUM SERPL-SCNC: 4.3 MMOL/L
PROT SERPL-MCNC: 7.1 G/DL
SODIUM SERPL-SCNC: 142 MMOL/L
TRIGL SERPL-MCNC: 124 MG/DL
TSH SERPL-ACNC: 1.92 UIU/ML
VIT B12 SERPL-MCNC: 254 PG/ML

## 2024-03-24 RX ORDER — POTASSIUM CHLORIDE 750 MG/1
10 CAPSULE, EXTENDED RELEASE ORAL
Qty: 90 | Refills: 1 | Status: ACTIVE | COMMUNITY
Start: 2018-05-05 | End: 1900-01-01

## 2024-03-24 RX ORDER — ATORVASTATIN CALCIUM 20 MG/1
20 TABLET, FILM COATED ORAL
Qty: 90 | Refills: 1 | Status: ACTIVE | COMMUNITY
Start: 2018-06-21 | End: 1900-01-01

## 2024-03-24 NOTE — DISCUSSION/SUMMARY
[EKG obtained to assist in diagnosis and management of assessed problem(s)] : EKG obtained to assist in diagnosis and management of assessed problem(s) [FreeTextEntry1] : IMPRESSION: Mr. Wakefield is an 78 year old man with a history of a secundum ASD status post closure with a bovine pericardial patch 3/17/2016, coronary artery disease status post 2-vessel CABG with LIMA to the LAD and SVG to the diagonal, resection of the left atrial appendage, and left CryoMaze ablation all on 3/17/2016 whose procedures were complicated by a cerebrovascular accident with residual weakness, paroxysmal atrial fibrillation, atrial flutter status post cardioversion 5/3/2016, HTN, and dyslipidemia who presents today for follow up of coronary artery disease and atrial flutter.  PLAN: 1. He is currently in sinus rhythm on his ECG, thus he will continue on Propranolol 20 mg three times a day. He will also continue on Xarelto 20mg daily for systemic anticoagulation. I will be checking a CBC today. 2. He will continue on ASA 81 mg daily given his CAD status post CABG. He will continue on Lipitor 20 mg daily. His LDL goal is less than 70. I will be checking a CMP and lipid profile today.   3. His blood pressure was fine when it was repeated at the time of the physical examination, thus he will continue on Lasix 20 mg daily and Propranolol.   4. He will follow up with me in 6 months or sooner should he experience any symptoms in the interim. 5. He understands that he requires antibiotic prophylaxis for dental procedures.  6. He will have an echocardiogram performed in about 3 months to follow up his LV dysfunction.

## 2024-03-24 NOTE — HISTORY OF PRESENT ILLNESS
[FreeTextEntry1] : Patient is a 78 year old man with a history of a secundum ASD status post closure with a bovine pericardial patch 3/17/16, coronary artery disease status post LIMA to LAD and SVG to diagonal artery, resection of MARIEL, and left CryoMaze ablation 3/17/16 whose surgery was complicated by a cerebrovascular accident with residual hemiparesis, paroxysmal atrial fibrillation, atrial flutter status cardioversion 5/3/2016, status post cardioversion for atrial flutter on 1/27/2023, right and left atrial ablation 2/23/2023, HTN, and dyslipidemia who presents today for follow up of atrial flutter and coronary artery disease. He states that he has been feeling relatively well, denying any chest pain, shortness of breath at rest, palpitations, headaches or dizziness.  He wants to go for physical therapy given his history of a stroke.

## 2024-03-24 NOTE — PHYSICAL EXAM
[General Appearance - Well Developed] : well developed [Normal Appearance] : normal appearance [Well Groomed] : well groomed [General Appearance - Well Nourished] : well nourished [No Deformities] : no deformities [General Appearance - In No Acute Distress] : no acute distress [Normal Conjunctiva] : the conjunctiva exhibited no abnormalities [Normal Jugular Venous A Waves Present] : normal jugular venous A waves present [Normal Jugular Venous V Waves Present] : normal jugular venous V waves present [Respiration, Rhythm And Depth] : normal respiratory rhythm and effort [Exaggerated Use Of Accessory Muscles For Inspiration] : no accessory muscle use [Auscultation Breath Sounds / Voice Sounds] : lungs were clear to auscultation bilaterally [Normal Rate] : normal [Rhythm Regular] : regular [Normal S1] : normal S1 [Normal S2] : normal S2 [II] : a grade 2 [I] : a grade 1 [No Pitting Edema] : no pitting edema present [Bowel Sounds] : normal bowel sounds [Abdomen Soft] : soft [Abdomen Tenderness] : non-tender [Nail Clubbing] : no clubbing of the fingernails [Cyanosis, Localized] : no localized cyanosis [Petechial Hemorrhages (___cm)] : no petechial hemorrhages [] : no rash [Skin Color & Pigmentation] : normal skin color and pigmentation [No Skin Ulcers] : no skin ulcer [Oriented To Time, Place, And Person] : oriented to person, place, and time [Affect] : the affect was normal [Mood] : the mood was normal [No Anxiety] : not feeling anxious [Normal Oral Mucosa] : normal oral mucosa [No Oral Pallor] : no oral pallor [No Oral Cyanosis] : no oral cyanosis [S3] : no S3 [Left Carotid Bruit] : no bruit heard over the left carotid [Right Carotid Bruit] : no bruit heard over the right carotid [Bruit] : no bruit heard [FreeTextEntry1] : He has weakness of his left lower extremity resulting in a slow gait.

## 2024-03-24 NOTE — CARDIOLOGY SUMMARY
[LVEF ___%] : LVEF [unfilled]% [None] : no pulmonary hypertension [Mild] : mild mitral regurgitation [___] : [unfilled] [de-identified] : 3/19/2024: Sinus Rhythm at 86 bpm with a first degree AV block and a left bundle branch block  [de-identified] : JULIANE performed on 1/27/2023: Mitral annular calcification with calcified mitral chordae. Tethered mitral valve leaflets with normal opening. Mild-moderate mitral regurgitation. Calcified trileaflet aortic valve with normal opening. Peak transaortic valve gradient equals 2 mmHg. Minimal aortic regurgitation. Normal size aortic root, arch, and descending thoracic aorta. Simple atheroma noted in the aortic arch and descending aorta. Severely dilated left atrium.  No left atrial or left atrial appendage thrombus. Decreased left atrial appendage velocities noted. Endocardium suboptimally visualized on TTE imaging; grossly moderate-severe left ventricular systolic dysfunction. Septal motion consistent with conduction defect. Moderate right atrial enlargement. Right ventricular enlargement with decreased right ventricular systolic function. Moderate tricuspid regurgitation. Agitated saline injection and color flow doppler demonstrate no evidence of a patent foramen ovale.    JULIANE performed on 10/28/2022: Tethered mitral valve leaflets with normal opening. Moderate mitral regurgitation. Mild atheroma noted in aortic arch/descending aorta. Severe left atrial enlargement. S/p surgical closure, Small remnant of left atrial appendage. No thrombus seen in left atrium or left atrial appendage. Appendage visualized with Definity. Endocardium not well visualized; grossly moderately reduced  left ventricular systolic function. Moderate right atrial enlargement. Right ventricular enlargement with decreased right ventricular systolic function. Mild-moderate tricuspid regurgitation. No evidence of a patent foramen ovale.   5/19/2022: Technically difficult study. Mild mitral regurgitation. Mild aortic stenosis with an ANÍBAL= 2 sqcm. Mild aortic regurgitation. Severely dilated left atrium. There is no obvious flow seen across the atrial septal closure device.  Mild concentric left ventricular hypertrophy. Endocardium not well visualized; grossly mild segmental left ventricular systolic dysfunction with hypokinesis of the inferior wall. There is paradoxical septal motion, likely secondary to prior cardiac surgery.  Moderate diastolic dysfunction. Moderate right atrial enlargement. Right ventricular enlargement with decreased right ventricular systolic function. No pulmonary HTN with PASP= 31 mm Hg. Mild tricuspid regurgitation. [de-identified] : 10/28/2022: Cardioversion

## 2024-03-25 LAB
BASOPHILS # BLD AUTO: 0.02 K/UL
BASOPHILS NFR BLD AUTO: 0.3 %
EOSINOPHIL # BLD AUTO: 0.15 K/UL
EOSINOPHIL NFR BLD AUTO: 2.4 %
HCT VFR BLD CALC: 44.6 %
HGB BLD-MCNC: 14.8 G/DL
IMM GRANULOCYTES NFR BLD AUTO: 0.3 %
LYMPHOCYTES # BLD AUTO: 1.75 K/UL
LYMPHOCYTES NFR BLD AUTO: 28.5 %
MAN DIFF?: NORMAL
MCHC RBC-ENTMCNC: 31.4 PG
MCHC RBC-ENTMCNC: 33.2 GM/DL
MCV RBC AUTO: 94.7 FL
MONOCYTES # BLD AUTO: 0.47 K/UL
MONOCYTES NFR BLD AUTO: 7.6 %
NEUTROPHILS # BLD AUTO: 3.74 K/UL
NEUTROPHILS NFR BLD AUTO: 60.9 %
PLATELET # BLD AUTO: 216 K/UL
RBC # BLD: 4.71 M/UL
RBC # FLD: 13.4 %
WBC # FLD AUTO: 6.15 K/UL

## 2024-03-25 RX ORDER — EZETIMIBE 10 MG/1
10 TABLET ORAL
Qty: 1 | Refills: 0 | Status: ACTIVE | COMMUNITY
Start: 2024-03-25 | End: 1900-01-01

## 2024-03-25 NOTE — HISTORY OF PRESENT ILLNESS
[FreeTextEntry1] : Patient is a 77 year old man with a history of a secundum ASD status post closure with a bovine pericardial patch 3/17/16, coronary artery disease status post LIMA to LAD and SVG to diagonal artery, resection of MARIEL, and left CryoMaze ablation 3/17/16 whose surgery was complicated by a cerebrovascular accident with residual hemiparesis, paroxysmal atrial fibrillation, atrial flutter status cardioversion 5/3/2016, status post cardioversion for atrial flutter on 1/27/2023, right and left atrial ablation 2/23/2023, HTN, and dyslipidemia who presents today for follow up of atrial flutter and coronary artery disease. He states that he has been feeling well following ablation, denying any chest pain, shortness of breath at rest, palpitations, headaches or dizziness.

## 2024-03-25 NOTE — CARDIOLOGY SUMMARY
[LVEF ___%] : LVEF [unfilled]% [None] : no pulmonary hypertension [Mild] : mild mitral regurgitation [___] : [unfilled] [de-identified] : 5/9/2023: Sinus Rhythm at 84 bpm with a first degree AV block and a left bundle branch block [de-identified] : 10/28/2022: Cardioversion  [de-identified] : JULIANE performed on 10/28/2022: Tethered mitral valve leaflets with normal opening. Moderate mitral regurgitation. Mild atheroma noted in aortic arch/descending aorta. Severe left atrial enlargement. S/p surgical closure, Small remnant of left atrial appendage. No thrombus seen in left atrium or left atrial appendage. Appendage visualized with Definity. Endocardium not well visualized; grossly\par moderately reduced  left ventricular systolic function. Moderate right atrial enlargement. Right ventricular enlargement with decreased right ventricular systolic function. Mild-moderate tricuspid regurgitation. No evidence of a patent foramen ovale.\par \par \par 5/19/2022: Technically difficult study.\par Mild mitral regurgitation. Mild aortic stenosis with an ANÍBAL= 2 sqcm. Mild aortic regurgitation. Severely dilated left atrium. There is no obvious flow seen across the atrial septal closure device.  Mild concentric left ventricular hypertrophy. Endocardium not well visualized; grossly mild segmental left ventricular systolic dysfunction with hypokinesis of the inferior wall. There is paradoxical septal motion, likely secondary to prior cardiac surgery.  Moderate diastolic dysfunction. Moderate right atrial enlargement. Right ventricular enlargement with decreased right ventricular systolic function. No pulmonary HTN with PASP= 31 mm Hg. Mild tricuspid regurgitation.

## 2024-03-25 NOTE — DISCUSSION/SUMMARY
[FreeTextEntry1] : IMPRESSION: Mr. Wakefield is an 77 year old man with a history of a secundum ASD status post closure with a bovine pericardial patch 3/17/16, coronary artery disease status post LIMA to LAD and SVG to diagonal artery, resection of MARIEL, and left CryoMaze ablation 3/17/16 whose surgery was complicated by a cerebrovascular accident with residual hemiparesis, paroxysmal atrial fibrillation, atrial flutter status cardioversion 5/3/2016, status post cardioversion for atrial flutter on 1/27/2023, right and left atrial ablation 2/23/2023, HTN, and dyslipidemia who presents today for follow up of atrial flutter and coronary artery disease.   PLAN: 1. He is currently in sinus rhythm on his ECG, thus he will continue on Propranolol 20 mg three times a day. He will also continue on Xarelto 20mg daily for systemic anticoagulation. I will be checking a CBC today. He states that he has been feeling better following ablation.  2. He will continue on ASA 81 mg daily given his CAD status post CABG. He will continue on Lipitor 20 mg daily. His LDL goal is less than 70. I will be checking a CMP and lipid profile today.   3. His blood pressure was OK when it was repeated at the time of the physical examination, thus he will continue on Lasix 20 mg daily and Propranolol.   4. He will follow up with me in 4 months or sooner should he experience any symptoms in the interim. 5. He understands that he requires antibiotic prophylaxis for dental procedures.  [EKG obtained to assist in diagnosis and management of assessed problem(s)] : EKG obtained to assist in diagnosis and management of assessed problem(s)

## 2024-05-02 NOTE — PRE-ANESTHESIA EVALUATION ADULT - NSPREOPDXFT_GEN_ALL_CORE
Cardiac Catheterization Appropriate Use    History of Heart Failure? []  Yes  [x]  No     Newly Diagnosed? []  Yes  []  No     NYHA class    [] I  [] II  [] III  [] IV            Stress Test Peformed * Important for AUC criteria []  Yes  [x]  No         If yes, specify test performed and must include risk of ischemia      StressTest Type   Test Result   Risk of Ischemia   [] Standard Exercise ST             (without imaging)  [] Stress Echo  [] ST Nuclear   [] ST with CMR    [] Positive                   []Negative  [] Indeterminate  [] Unavailable [] High   [] Intermediate  [] Low  [] Unavailable        Indication(s) for Cath Lab Visit  (select all that apply)    [x] ACS  [] New onset angina<=2 months  [] Worsening Angina  [] Resuscitated Cardiac Arrest   [] Stable Known CAD  [] Suspected CAD  [] Valvular Disease  [] Pericardial Disease  [] Pre-Operative Evaluation  []  Syncope []Post-Cardiac Transplant  []Cardiac Arrhythmias   [] Cardiomyopathy  [] LV dysfunction  [] Evaluation for Exercise Clearance  [] Other         Chest Pain Symptoms     [x] Typical Angina      []   Atypical                                Angina      [] Non-anginal Chest Pain []Asymptomatic      Cardiovascular Instability  [x] Yes  []  No         If yes, specify instability type  (select all that apply)    [x] Persistent Ischemic                     Symptoms(chest pain)   [] Hemodynamic Instability(not        Cardiogenic shock)  [] Cardiogenic Shock  [] Refractory Cardiogenic Shock   [] Acute Heart Failure Symptoms  [] Ventricular Arrhythmia        Evaluation for Surgery []  Cardiac Surgery        []  Non-Cardiac Surgery      Functional Capacity []<4 METS  []>= 4 METS without symptoms  []  >=4 mets with symptoms    [x]  Unknown     Surgical Risk []  Low     []  Intermediate    []High Risk Vascular  []  High Risk Non-Vascular     Only at [facility]:  Study  ACC Bleeding Risk Score: n/a %   []  Low: (? 2%)   []  Moderate  (> 2% and ? 6.5%) []   High (> 6.5%)         CathPCI Risk [email or web address])              ATYPICAL FLUTTER

## 2024-06-04 RX ORDER — PROPRANOLOL HYDROCHLORIDE 10 MG/1
10 TABLET ORAL
Qty: 540 | Refills: 0 | Status: ACTIVE | COMMUNITY
Start: 2017-12-18 | End: 1900-01-01

## 2024-06-04 RX ORDER — PROPRANOLOL HYDROCHLORIDE 10 MG/1
10 TABLET ORAL
Qty: 540 | Refills: 0 | Status: ACTIVE | COMMUNITY
Start: 2022-12-02 | End: 1900-01-01

## 2024-10-22 ENCOUNTER — LABORATORY RESULT (OUTPATIENT)
Age: 79
End: 2024-10-22

## 2024-10-22 ENCOUNTER — NON-APPOINTMENT (OUTPATIENT)
Age: 79
End: 2024-10-22

## 2024-10-22 ENCOUNTER — APPOINTMENT (OUTPATIENT)
Dept: CARDIOLOGY | Facility: CLINIC | Age: 79
End: 2024-10-22

## 2024-10-22 VITALS
RESPIRATION RATE: 12 BRPM | OXYGEN SATURATION: 97 % | HEART RATE: 89 BPM | HEIGHT: 69 IN | DIASTOLIC BLOOD PRESSURE: 81 MMHG | SYSTOLIC BLOOD PRESSURE: 131 MMHG | BODY MASS INDEX: 28.14 KG/M2 | WEIGHT: 190 LBS

## 2024-10-22 DIAGNOSIS — I63.9 CEREBRAL INFARCTION, UNSPECIFIED: ICD-10-CM

## 2024-10-22 DIAGNOSIS — I25.10 ATHEROSCLEROTIC HEART DISEASE OF NATIVE CORONARY ARTERY W/OUT ANGINA PECTORIS: ICD-10-CM

## 2024-10-22 PROCEDURE — G2211 COMPLEX E/M VISIT ADD ON: CPT

## 2024-10-22 PROCEDURE — 93000 ELECTROCARDIOGRAM COMPLETE: CPT

## 2024-10-22 PROCEDURE — 93306 TTE W/DOPPLER COMPLETE: CPT

## 2024-10-22 PROCEDURE — 99214 OFFICE O/P EST MOD 30 MIN: CPT

## 2024-10-23 LAB
25(OH)D3 SERPL-MCNC: 30 NG/ML
ALBUMIN SERPL ELPH-MCNC: 4.5 G/DL
ALP BLD-CCNC: 81 U/L
ALT SERPL-CCNC: 18 U/L
ANION GAP SERPL CALC-SCNC: 15 MMOL/L
APPEARANCE: CLEAR
AST SERPL-CCNC: 18 U/L
BASOPHILS # BLD AUTO: 0.02 K/UL
BASOPHILS NFR BLD AUTO: 0.3 %
BILIRUB SERPL-MCNC: 1.1 MG/DL
BILIRUBIN URINE: NEGATIVE
BLOOD URINE: NEGATIVE
BUN SERPL-MCNC: 26 MG/DL
CALCIUM SERPL-MCNC: 9.6 MG/DL
CHLORIDE SERPL-SCNC: 102 MMOL/L
CHOLEST SERPL-MCNC: 116 MG/DL
CO2 SERPL-SCNC: 23 MMOL/L
COLOR: NORMAL
CREAT SERPL-MCNC: 1.12 MG/DL
EGFR: 66 ML/MIN/1.73M2
EOSINOPHIL # BLD AUTO: 0.14 K/UL
EOSINOPHIL NFR BLD AUTO: 2.1 %
ESTIMATED AVERAGE GLUCOSE: 108 MG/DL
FOLATE SERPL-MCNC: 8.6 NG/ML
GLUCOSE QUALITATIVE U: NEGATIVE MG/DL
GLUCOSE SERPL-MCNC: 107 MG/DL
HBA1C MFR BLD HPLC: 5.4 %
HCT VFR BLD CALC: 46.8 %
HDLC SERPL-MCNC: 37 MG/DL
HGB BLD-MCNC: 15.2 G/DL
IMM GRANULOCYTES NFR BLD AUTO: 0.1 %
KETONES URINE: NEGATIVE MG/DL
LDLC SERPL CALC-MCNC: 63 MG/DL
LEUKOCYTE ESTERASE URINE: ABNORMAL
LYMPHOCYTES # BLD AUTO: 2.13 K/UL
LYMPHOCYTES NFR BLD AUTO: 31.6 %
MAN DIFF?: NORMAL
MCHC RBC-ENTMCNC: 31 PG
MCHC RBC-ENTMCNC: 32.5 GM/DL
MCV RBC AUTO: 95.3 FL
MONOCYTES # BLD AUTO: 0.53 K/UL
MONOCYTES NFR BLD AUTO: 7.9 %
NEUTROPHILS # BLD AUTO: 3.91 K/UL
NEUTROPHILS NFR BLD AUTO: 58 %
NITRITE URINE: NEGATIVE
NONHDLC SERPL-MCNC: 79 MG/DL
PH URINE: 5.5
PLATELET # BLD AUTO: 223 K/UL
POTASSIUM SERPL-SCNC: 4 MMOL/L
PROT SERPL-MCNC: 7.2 G/DL
PROTEIN URINE: NORMAL MG/DL
PSA SERPL-MCNC: 1.09 NG/ML
RBC # BLD: 4.91 M/UL
RBC # FLD: 13.4 %
SODIUM SERPL-SCNC: 140 MMOL/L
SPECIFIC GRAVITY URINE: 1.03
TRIGL SERPL-MCNC: 80 MG/DL
TSH SERPL-ACNC: 1.88 UIU/ML
UROBILINOGEN URINE: 1 MG/DL
VIT B12 SERPL-MCNC: 351 PG/ML
WBC # FLD AUTO: 6.74 K/UL

## 2024-11-06 ENCOUNTER — RX RENEWAL (OUTPATIENT)
Age: 79
End: 2024-11-06

## 2024-11-20 ENCOUNTER — APPOINTMENT (OUTPATIENT)
Dept: CARDIOLOGY | Facility: CLINIC | Age: 79
End: 2024-11-20

## 2024-11-20 PROCEDURE — 78452 HT MUSCLE IMAGE SPECT MULT: CPT

## 2024-11-20 PROCEDURE — 93015 CV STRESS TEST SUPVJ I&R: CPT

## 2024-11-20 PROCEDURE — A9500: CPT

## 2024-11-28 NOTE — PATIENT PROFILE ADULT - WAS YOUR LAST COVID-19 VACCINE GREATER THAN OR EQUAL TO TWO MONTHS AGO?
Yes

## 2025-04-22 ENCOUNTER — NON-APPOINTMENT (OUTPATIENT)
Age: 80
End: 2025-04-22

## 2025-04-22 ENCOUNTER — APPOINTMENT (OUTPATIENT)
Dept: CARDIOLOGY | Facility: CLINIC | Age: 80
End: 2025-04-22
Payer: MEDICARE

## 2025-04-22 VITALS
DIASTOLIC BLOOD PRESSURE: 80 MMHG | WEIGHT: 186 LBS | RESPIRATION RATE: 12 BRPM | OXYGEN SATURATION: 98 % | BODY MASS INDEX: 27.55 KG/M2 | HEIGHT: 69 IN | SYSTOLIC BLOOD PRESSURE: 130 MMHG | HEART RATE: 90 BPM

## 2025-04-22 DIAGNOSIS — I25.10 ATHEROSCLEROTIC HEART DISEASE OF NATIVE CORONARY ARTERY W/OUT ANGINA PECTORIS: ICD-10-CM

## 2025-04-22 PROCEDURE — 93000 ELECTROCARDIOGRAM COMPLETE: CPT

## 2025-04-22 PROCEDURE — 99214 OFFICE O/P EST MOD 30 MIN: CPT | Mod: 25

## 2025-09-16 ENCOUNTER — APPOINTMENT (OUTPATIENT)
Dept: CARDIOLOGY | Facility: CLINIC | Age: 80
End: 2025-09-16
Payer: MEDICARE

## 2025-09-16 ENCOUNTER — LABORATORY RESULT (OUTPATIENT)
Age: 80
End: 2025-09-16

## 2025-09-16 ENCOUNTER — NON-APPOINTMENT (OUTPATIENT)
Age: 80
End: 2025-09-16

## 2025-09-16 VITALS
DIASTOLIC BLOOD PRESSURE: 73 MMHG | SYSTOLIC BLOOD PRESSURE: 128 MMHG | OXYGEN SATURATION: 97 % | TEMPERATURE: 97.3 F | BODY MASS INDEX: 26.96 KG/M2 | HEART RATE: 97 BPM | WEIGHT: 182 LBS | HEIGHT: 69 IN | RESPIRATION RATE: 18 BRPM

## 2025-09-16 DIAGNOSIS — I25.10 ATHEROSCLEROTIC HEART DISEASE OF NATIVE CORONARY ARTERY W/OUT ANGINA PECTORIS: ICD-10-CM

## 2025-09-16 LAB
APPEARANCE: ABNORMAL
BILIRUBIN URINE: NEGATIVE
BLOOD URINE: NEGATIVE
COLOR: NORMAL
ESTIMATED AVERAGE GLUCOSE: 105 MG/DL
GLUCOSE QUALITATIVE U: NEGATIVE MG/DL
HBA1C MFR BLD HPLC: 5.3 %
KETONES URINE: NEGATIVE MG/DL
LEUKOCYTE ESTERASE URINE: ABNORMAL
NITRITE URINE: NEGATIVE
PH URINE: 6
PROTEIN URINE: NORMAL MG/DL
SPECIFIC GRAVITY URINE: 1.02
UROBILINOGEN URINE: 2 MG/DL

## 2025-09-16 PROCEDURE — 93000 ELECTROCARDIOGRAM COMPLETE: CPT

## 2025-09-16 PROCEDURE — 99214 OFFICE O/P EST MOD 30 MIN: CPT | Mod: 25

## 2025-09-17 DIAGNOSIS — E53.8 DEFICIENCY OF OTHER SPECIFIED B GROUP VITAMINS: ICD-10-CM

## 2025-09-17 DIAGNOSIS — E78.5 HYPERLIPIDEMIA, UNSPECIFIED: ICD-10-CM

## 2025-09-17 LAB
25(OH)D3 SERPL-MCNC: 22.5 NG/ML
ALBUMIN SERPL ELPH-MCNC: 4.4 G/DL
ALP BLD-CCNC: 74 U/L
ALT SERPL-CCNC: 16 U/L
ANION GAP SERPL CALC-SCNC: 15 MMOL/L
AST SERPL-CCNC: 22 U/L
BILIRUB SERPL-MCNC: 0.9 MG/DL
BUN SERPL-MCNC: 22 MG/DL
CALCIUM SERPL-MCNC: 9.5 MG/DL
CHLORIDE SERPL-SCNC: 105 MMOL/L
CHOLEST SERPL-MCNC: 212 MG/DL
CO2 SERPL-SCNC: 24 MMOL/L
CREAT SERPL-MCNC: 0.98 MG/DL
EGFRCR SERPLBLD CKD-EPI 2021: 77 ML/MIN/1.73M2
FOLATE SERPL-MCNC: 6.2 NG/ML
GLUCOSE SERPL-MCNC: 104 MG/DL
HDLC SERPL-MCNC: 36 MG/DL
LDLC SERPL-MCNC: 157 MG/DL
NONHDLC SERPL-MCNC: 176 MG/DL
POTASSIUM SERPL-SCNC: 4 MMOL/L
PROT SERPL-MCNC: 6.9 G/DL
PSA SERPL-MCNC: 1.14 NG/ML
SODIUM SERPL-SCNC: 144 MMOL/L
TRIGL SERPL-MCNC: 108 MG/DL
TSH SERPL-ACNC: 1.24 UIU/ML
VIT B12 SERPL-MCNC: 290 PG/ML

## 2025-09-18 LAB
BASOPHILS # BLD AUTO: 0.01 K/UL
BASOPHILS NFR BLD AUTO: 0.2 %
EOSINOPHIL # BLD AUTO: 0.09 K/UL
EOSINOPHIL NFR BLD AUTO: 1.8 %
HCT VFR BLD CALC: 43.4 %
HGB BLD-MCNC: 14.7 G/DL
IMM GRANULOCYTES NFR BLD AUTO: 0.4 %
LYMPHOCYTES # BLD AUTO: 1.6 K/UL
LYMPHOCYTES NFR BLD AUTO: 31.7 %
MAN DIFF?: NORMAL
MCHC RBC-ENTMCNC: 31.1 PG
MCHC RBC-ENTMCNC: 33.9 G/DL
MCV RBC AUTO: 91.9 FL
MONOCYTES # BLD AUTO: 0.39 K/UL
MONOCYTES NFR BLD AUTO: 7.7 %
NEUTROPHILS # BLD AUTO: 2.93 K/UL
NEUTROPHILS NFR BLD AUTO: 58.2 %
PLATELET # BLD AUTO: 199 K/UL
RBC # BLD: 4.72 M/UL
RBC # FLD: 13.3 %
WBC # FLD AUTO: 5.04 K/UL